# Patient Record
Sex: MALE | Race: WHITE | NOT HISPANIC OR LATINO | Employment: FULL TIME | URBAN - METROPOLITAN AREA
[De-identification: names, ages, dates, MRNs, and addresses within clinical notes are randomized per-mention and may not be internally consistent; named-entity substitution may affect disease eponyms.]

---

## 2017-01-12 ENCOUNTER — ANTICOAGULATION MONITORING (OUTPATIENT)
Dept: VASCULAR LAB | Facility: MEDICAL CENTER | Age: 42
End: 2017-01-12

## 2017-01-12 DIAGNOSIS — D68.2 FACTOR V DEFICIENCY (HCC): ICD-10-CM

## 2017-01-12 DIAGNOSIS — I73.9 PAD (PERIPHERAL ARTERY DISEASE) (HCC): ICD-10-CM

## 2017-01-12 NOTE — PROGRESS NOTES
OP Anticoagulation Telephone Note    Date: 1/12/2017       Plan:  Called to remind patient to get PT/INR lab done.  Left VM    Helen Remy, Pharm D

## 2017-01-12 NOTE — Clinical Note
Pt has been noncompliant with follow up, attempt to contact x 3 and sent letter with no response.  Can we discharge him from clinic?  Thank you,  Helen

## 2017-01-15 ENCOUNTER — TELEPHONE (OUTPATIENT)
Dept: VASCULAR LAB | Facility: MEDICAL CENTER | Age: 42
End: 2017-01-15

## 2017-01-15 NOTE — TELEPHONE ENCOUNTER
Patient has not responded to multiple phone calls and letters regarding anticoag f/u.    Pending any further patient contact we will d/c from anticoag clinic for non-compliance    Michael Bloch, MD  Anticoagulation Clinic    Cc:  BARBARA Ramey MD

## 2017-02-21 ENCOUNTER — ANTICOAGULATION MONITORING (OUTPATIENT)
Dept: VASCULAR LAB | Facility: MEDICAL CENTER | Age: 42
End: 2017-02-21

## 2017-02-21 DIAGNOSIS — D68.2 FACTOR V DEFICIENCY (HCC): ICD-10-CM

## 2017-02-21 DIAGNOSIS — I73.9 PAD (PERIPHERAL ARTERY DISEASE) (HCC): ICD-10-CM

## 2017-02-21 NOTE — PROGRESS NOTES
Discharged from [facility].  Secondary to non-adherence/non compliance  Serenity Wilcox, PHARMD

## 2017-03-17 RX ORDER — WARFARIN SODIUM 5 MG/1
2.5-5 TABLET ORAL
Qty: 30 TAB | Refills: 0 | Status: SHIPPED | OUTPATIENT
Start: 2017-03-17 | End: 2017-05-03 | Stop reason: SDUPTHER

## 2017-06-14 RX ORDER — WARFARIN SODIUM 5 MG/1
TABLET ORAL
Qty: 15 TAB | Refills: 0 | Status: SHIPPED | OUTPATIENT
Start: 2017-06-14 | End: 2017-07-19 | Stop reason: SDUPTHER

## 2017-06-14 NOTE — TELEPHONE ENCOUNTER
Patient has not done follow-up INR in a while. He is under the care of the Coumadin clinic. He should follow-up with them as soon as possible. I will only give 15 day supply. Patient also needs follow-up appointment with me.

## 2017-07-13 RX ORDER — WARFARIN SODIUM 5 MG/1
TABLET ORAL
Qty: 15 TAB | Refills: 0 | Status: CANCELLED | OUTPATIENT
Start: 2017-07-13

## 2017-07-13 NOTE — TELEPHONE ENCOUNTER
Per note from Dr. Ramey in June, patient goes to Coumadin Clinic. He needs to contact them for warfarin refills.  Aleksandra Trevino M.D.

## 2017-07-14 ENCOUNTER — TELEPHONE (OUTPATIENT)
Dept: MEDICAL GROUP | Facility: PHYSICIAN GROUP | Age: 42
End: 2017-07-14

## 2017-07-14 NOTE — TELEPHONE ENCOUNTER
Pt called back regarding his warfarin being denied. I notified him  denied medication due to Dr. Rodriguez note stating pt was going to coumadin clinic. Pt notified me he doesn't have insurance he lost it and doesn't want a blood clot and that's why he was asking Dr. Ramey. I notified pt that she is out till next week. I let him know I would notify Dr. Trevino.

## 2017-07-14 NOTE — TELEPHONE ENCOUNTER
How much warfarin was he taking? I'll send in a prescription for two weeks. Dr. Ramey had done this last month and said he needed to be seen. We cannot continue to prescribe this for him unless he comes in for a visit.   Aleksandra Trevino M.D.

## 2017-07-19 ENCOUNTER — TELEPHONE (OUTPATIENT)
Dept: MEDICAL GROUP | Facility: PHYSICIAN GROUP | Age: 42
End: 2017-07-19

## 2017-07-19 RX ORDER — WARFARIN SODIUM 5 MG/1
TABLET ORAL
Qty: 15 TAB | Refills: 0 | Status: SHIPPED | OUTPATIENT
Start: 2017-07-19 | End: 2017-11-01 | Stop reason: SDUPTHER

## 2017-07-19 NOTE — TELEPHONE ENCOUNTER
Patient called stating he is now completely out of coumadin and is requesting that  fill this Rx.  I spoke with the coumadin clinic as it appears patient was to be following up with them.  Brad at the clinic informed me that patient was discharged due to non-compliance.  Patient needs to contact them at [phone] opt [phone] and schedule and appt for refill.     Phone Number Called: [phone] (home)     Message: LVM to call back.    Left Message for patient to call back: yes

## 2017-07-20 ENCOUNTER — HOSPITAL ENCOUNTER (EMERGENCY)
Facility: MEDICAL CENTER | Age: 42
End: 2017-07-20
Attending: EMERGENCY MEDICINE

## 2017-07-20 VITALS
OXYGEN SATURATION: 95 % | SYSTOLIC BLOOD PRESSURE: 142 MMHG | HEART RATE: 88 BPM | DIASTOLIC BLOOD PRESSURE: 86 MMHG | BODY MASS INDEX: 23.35 KG/M2 | WEIGHT: 172.4 LBS | TEMPERATURE: 96.8 F | HEIGHT: 72 IN | RESPIRATION RATE: 16 BRPM

## 2017-07-20 DIAGNOSIS — F41.8 SITUATIONAL ANXIETY: ICD-10-CM

## 2017-07-20 DIAGNOSIS — R20.2 PARESTHESIAS IN RIGHT HAND: ICD-10-CM

## 2017-07-20 LAB
INR PPP: 2.25 (ref 0.87–1.13)
PROTHROMBIN TIME: 25.6 SEC (ref 12–14.6)

## 2017-07-20 PROCEDURE — 93971 EXTREMITY STUDY: CPT | Mod: 26 | Performed by: SURGERY

## 2017-07-20 PROCEDURE — 85610 PROTHROMBIN TIME: CPT

## 2017-07-20 PROCEDURE — 36415 COLL VENOUS BLD VENIPUNCTURE: CPT

## 2017-07-20 PROCEDURE — 93971 EXTREMITY STUDY: CPT

## 2017-07-20 PROCEDURE — 99284 EMERGENCY DEPT VISIT MOD MDM: CPT

## 2017-07-20 PROCEDURE — 93931 UPPER EXTREMITY STUDY: CPT | Mod: 26 | Performed by: SURGERY

## 2017-07-20 PROCEDURE — 93931 UPPER EXTREMITY STUDY: CPT

## 2017-07-20 RX ORDER — WARFARIN SODIUM 5 MG/1
5 TABLET ORAL DAILY
Qty: 30 TAB | Refills: 3 | Status: SHIPPED | OUTPATIENT
Start: 2017-07-20 | End: 2017-09-07 | Stop reason: SDUPTHER

## 2017-07-20 ASSESSMENT — LIFESTYLE VARIABLES
EVER HAD A DRINK FIRST THING IN THE MORNING TO STEADY YOUR NERVES TO GET RID OF A HANGOVER: NO
TOTAL SCORE: 0
CONSUMPTION TOTAL: INCOMPLETE
TOTAL SCORE: 0
EVER FELT BAD OR GUILTY ABOUT YOUR DRINKING: NO
DO YOU DRINK ALCOHOL: YES
HAVE PEOPLE ANNOYED YOU BY CRITICIZING YOUR DRINKING: NO
HAVE YOU EVER FELT YOU SHOULD CUT DOWN ON YOUR DRINKING: NO
TOTAL SCORE: 0

## 2017-07-20 ASSESSMENT — PAIN SCALES - GENERAL: PAINLEVEL_OUTOF10: 4

## 2017-07-20 NOTE — ED AVS SNAPSHOT
Home Care Instructions                                                                                                                Devon Martinez   MRN: [id number]    Department:  [facility], Emergency Dept   Date of Visit:  7/20/2017            [facility], Emergency Dept    [address]    [place] [address]    Phone:  [phone]      You were seen by     Aditya Aranda D.O.      Your Diagnosis Was     Paresthesias in right hand     R20.2       Follow-up Information     1. Follow up with Niles Coulter M.D..    Specialties:  Surgery, Radiology    Contact information    [id number]  [facility] [place] [address] [phone]        Medication Information     Review all of your home medications and newly ordered medications with your primary doctor and/or pharmacist as soon as possible. Follow medication instructions as directed by your doctor and/or pharmacist.     Please keep your complete medication list with you and share with your physician. Update the information when medications are discontinued, doses are changed, or new medications (including over-the-counter products) are added; and carry medication information at all times in the event of emergency situations.               Medication List      CHANGE how you take these medications        Instructions    Morning Afternoon Evening Bedtime    * warfarin 5 MG Tabs   What changed:  Another medication with the same name was added. Make sure you understand how and when to take each.   Commonly known as:  COUMADIN        Doctor's comments:  Patient needs an appointment for additional refills.   TAKE 1/2 TABLET BY MOUTH ON MONDAY, TUESDAY, WEDNESDAY, FRIDAY, SATURDAY. TAKE 1 TABLET ON THURSDAY                        * warfarin 5 MG Tabs   What changed:  You were already taking a medication with the same name, and this prescription was added. Make sure you understand how and when to take each.   Commonly known as:   COUMADIN        Take 1 Tab by mouth every day.   Dose:  5 mg                        * Notice:  This list has 2 medication(s) that are the same as other medications prescribed for you. Read the directions carefully, and ask your doctor or other care provider to review them with you.      ASK your doctor about these medications        Instructions    Morning Afternoon Evening Bedtime    citalopram 20 MG Tabs   Commonly known as:  CELEXA        Take 1 Tab by mouth every day.   Dose:  20 mg                        clonazepam 1 MG Tabs   Commonly known as:  KLONOPIN        Doctor's comments:  Not to exceed 5 additional fills before 09/13/2016. Patient needs appointment for more refills.   TAKE 1 TO 2 TABLETS BY MOUTH 3 TIMES A DAY AS NEEDED                        hydrocodone-acetaminophen 5-325 MG Tabs per tablet   Commonly known as:  NORCO        Take 2 Tabs by mouth every 12 hours.   Dose:  2 Tab                        lisinopril 10 MG Tabs   Commonly known as:  PRINIVIL        Take 1 Tab by mouth every day.   Dose:  10 mg                        omeprazole 20 MG delayed-release capsule   Commonly known as:  PRILOSEC        Take 1 Cap by mouth every day.   Dose:  20 mg                        simvastatin 40 MG Tabs   Commonly known as:  ZOCOR        Take 2 Tabs by mouth every evening.   Dose:  80 mg                        zolpidem 5 MG Tabs   Commonly known as:  AMBIEN        TAKE 1 TABLET BY MOUTH AT BEDTIME AS NEEDED FOR SLEEP                             Where to Get Your Medications      These medications were sent to [id number] - [place] - [address]  [address] [place] [address]     Phone:  [phone]    - warfarin 5 MG Tabs            Procedures and tests performed during your visit     PT/INR    UE ART DUPLX/IMAG (Specify in Comments Left, Right Or Bilateral)    UE VENOUS DUPLEX (Specify in Comments Left, Right Or Bilateral)        Discharge Instructions       Paresthesia  Paresthesia is  an abnormal burning or prickling sensation. This sensation is generally felt in the hands, arms, legs, or feet. However, it may occur in any part of the body. Usually, it is not painful. The feeling may be described as:  · Tingling or numbness.  · Pins and needles.  · Skin crawling.  · Buzzing.  · Limbs falling asleep.  · Itching.  Most people experience temporary (transient) paresthesia at some time in their lives. Paresthesia may occur when you breathe too quickly (hyperventilation). It can also occur without any apparent cause. Commonly, paresthesia occurs when pressure is placed on a nerve. The sensation quickly goes away after the pressure is removed. For some people, however, paresthesia is a long-lasting (chronic) condition that is caused by an underlying disorder. If you continue to have paresthesia, you may need further medical evaluation.  HOME CARE INSTRUCTIONS  Watch your condition for any changes. Taking the following actions may help to lessen any discomfort that you are feeling:  · Avoid drinking alcohol.  · Try acupuncture or massage to help relieve your symptoms.  · Keep all follow-up visits as directed by your health care provider. This is important.  SEEK MEDICAL CARE IF:  · You continue to have episodes of paresthesia.  · Your burning or prickling feeling gets worse when you walk.  · You have pain, cramps, or dizziness.  · You develop a rash.  SEEK IMMEDIATE MEDICAL CARE IF:  · You feel weak.  · You have trouble walking or moving.  · You have problems with speech, understanding, or vision.  · You feel confused.  · You cannot control your bladder or bowel movements.  · You have numbness after an injury.  · You faint.     This information is not intended to replace advice given to you by your health care provider. Make sure you discuss any questions you have with your health care provider.     Document Released: 12/08/2003 Document Revised: 05/03/2016 Document Reviewed: 12/14/2015  [employer]  Interactive Patient Education ©2016 Elsevier Inc.            Patient Information     Patient Information    Following emergency treatment: all patient requiring follow-up care must return either to a private physician or a clinic if your condition worsens before you are able to obtain further medical attention, please return to the emergency room.     Billing Information    At [facility], we work to make the billing process streamlined for our patients.  Our Representatives are here to answer any questions you may have regarding your hospital bill.  If you have insurance coverage and have supplied your insurance information to us, we will submit a claim to your insurer on your behalf.  Should you have any questions regarding your bill, we can be reached online or by phone as follows:  Online: You are able pay your bills online or live chat with our representatives about any billing questions you may have. We are here to help Monday - Friday from 8:00am to 7:30pm and 9:00am - 12:00pm on Saturdays.  Please visit [email or web address]  for more information.   Phone:  [phone] or [phone]    Please note that your emergency physician, surgeon, pathologist, radiologist, anesthesiologist, and other specialists are not employed by [facility] and will therefore bill separately for their services.  Please contact them directly for any questions concerning their bills at the numbers below:     Emergency Physician Services:  [phone]  [facility]:  [phone]  [facility]:  [phone]  [facility]:  [phone]    1. Your final bill may vary from the amount quoted upon discharge if all procedures are not complete at that time, or if your doctor has additional procedures of which we are not aware. You will receive an additional bill if you return to the Emergency Department at [facility] the [facility] for suture removal regardless of the  facility of which the sutures were placed.     2. Please arrange for settlement of this account at the emergency registration.    3. All self-pay accounts are due in full at the time of treatment.  If you are unable to meet this obligation then payment is expected within 4-5 days.     4. If you have had radiology studies (CT, X-ray, Ultrasound, MRI), you have received a preliminary result during your emergency department visit. Please contact the radiology department [phone] to receive a copy of your final result. Please discuss the Final result with your primary physician or with the follow up physician provided.     Crisis Hotline:  [place] Crisis Hotline:  5-866-LFKKNGY or 1-313.789.9553  [place] Crisis Hotline:    1-276.159.8285 or 835-538-0352         ED Discharge Follow Up Questions    1. In order to provide you with very good care, we would like to follow up with a phone call in the next few days.  May we have your permission to contact you?     YES /  NO    2. What is the best phone number to call you? (       )_____-__________    3. What is the best time to call you?      Morning  /  Afternoon  /  Evening                   Patient Signature:  ____________________________________________________________    Date:  ____________________________________________________________

## 2017-07-20 NOTE — ED PROVIDER NOTES
"ED Provider Note    CHIEF COMPLAINT  Chief Complaint   Patient presents with   • Hand Pain     rt, hx of factor V       HPI  Devon Martinez is a 41 y.o. male here for evaluation of tingling in the right hand. The patient states that he had some tingling in the fingers of his right hand, and felt them 'cooler' with some throbbing yesterday. He states this resolved, and today came in for evaluation. He states he had similar symptoms to the left lower leg when he \"lost the leg\" after waiting too long to come in. He does take Coumadin, and did take his last dose last evening. He has no vomiting, no fever, no chills. He has no chest pain or shortness of breath. He has no discoloration to his hands.  The pt admits to a lot of working with his hands recently, and may have gripped something in a strange way, causing the numbness.     PAST MEDICAL HISTORY   has a past medical history of Factor V deficiency (CMS-HCC); HTN (hypertension); Dyslipidemia; FREDRICK (generalized anxiety disorder); and Panic attacks.    SOCIAL HISTORY  Social History     Social History Main Topics   • Smoking status: Former Smoker -- 1.00 packs/day for 20 years     Types: Cigarettes   • Smokeless tobacco: Never Used      Comment: quit in 2011   • Alcohol Use: 0.0 oz/week     0 Standard drinks or equivalent per week      Comment: 4-5 beers/day   • Drug Use: No   • Sexual Activity:     Partners: Female       SURGICAL HISTORY   has past surgical history that includes femoral embolectomy (11/30/2009); artery exploration or repair (11/30/2009); thrombectomy (11/30/2009); angiogram (12/2/2009); femoral tibial bypass (12/2/2009); 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    CURRENT MEDICATIONS  Home Medications     Reviewed by Brad England, STUDENT (Nurse Apprentice) on 07/20/17 at 0846  Med List Status: Partial    Medication Last Dose Status    citalopram (CELEXA) 20 MG Tab  Active    clonazepam (KLONOPIN) 1 MG Tab  Active    " hydrocodone-acetaminophen (NORCO) 5-325 MG Tab per tablet  Active    lisinopril (PRINIVIL) 10 MG Tab  Active    omeprazole (PRILOSEC) 20 MG delayed-release capsule  Active    simvastatin (ZOCOR) 40 MG Tab  Active    warfarin (COUMADIN) 5 MG Tab 7/19/2017 Active    zolpidem (AMBIEN) 5 MG Tab  Active                ALLERGIES  Allergies   Allergen Reactions   • Pcn [Penicillins] Hives     RXN=>15 years ago       REVIEW OF SYSTEMS  See HPI for further details. Review of systems as above, otherwise all other systems are negative.     PHYSICAL EXAM  VITAL SIGNS: /86 mmHg  Pulse 97  Temp(Src) 36 °C (96.8 °F)  Resp 17  Ht 1.829 m (6')  Wt 78.2 kg (172 lb 6.4 oz)  BMI 23.38 kg/m2  SpO2 96%    Constitutional: No distress. Well nourished.  HENT: Head is atraumatic. Oropharynx is moist.   Eyes: Conjunctivae are normal. EOMI.   Respiratory: No respiratory distress. Equal chest expansion.   Musculoskeletal: Normal range of motion. No edema.  Right upper ext:  Normal temperature, n/v intact distally.  Cap refill less than 2 seconds, normal sensation.  Normal strength.  No lesions.   Neurological: Alert. No focal deficits noted.    Skin: No rash. No Pallor.   Psych: Appropriate for clinical situation. Normal affect.    PROCEDURES     MEDICAL RECORD  I have reviewed patient's medical record and pertinent results are listed above.    COURSE & MEDICAL DECISION MAKING  I have reviewed any medical record information, laboratory studies and radiographic results as noted above.    UE ART DUPLX/IMAG (Specify in Comments Left, Right Or Bilateral)   Preliminary Result      UE VENOUS DUPLEX (Specify in Comments Left, Right Or Bilateral)   Preliminary Result        Results for orders placed or performed during the hospital encounter of 07/20/17   PT/INR   Result Value Ref Range    PT 25.6 (H) 12.0 - 14.6 sec    INR 2.25 (H) 0.87 - 1.13     11:00 AM  Pt remains asymptomatic at this time.     11:17 AM  At this time, the pt had some  intermittent paresthesias to this right hand, that resolved quickly.  He has a normal exam, normal cap refill, normal sensation, and normal pulses.  He has had an u/s of the arterial and venous system.  They are both normal.  He has equal .  His hand is of normal temperature.  His INR is therapeutic at this time, and he is compliant with his Coumadin. This time I'll have him follow-up with Dr. Coulter as necessary, and return here for any further issues.    11:27 AM  I spoke to Dr. Coulter.  He states ok for discharge.  Nothing to do at this time.       Differential diagnoses include but not limited to:  Venous / arterial occlusion.  Anxiety,    This patient presents with hand paresthesia and coolness, all now resolved. .  At this time, I have counseled the patient/family regarding their medications, pain control, and follow up.  They will continue their medications, if any, as prescribed.  They will return immediately for any worsening symptoms and/or any other medical concerns.  They will see their doctor, or contact the doctor provided, in 1-2 days for follow up.         FINAL IMPRESSION  1. Paresthesias in right hand    2. Situational anxiety                      Electronically signed by: Aditya Aranda, 7/20/2017 9:01 AM

## 2017-07-20 NOTE — ED NOTES
Assumed care of patient, discussed plan of care with patient, MD at bedside, all questions answered, VSS, NAD

## 2017-07-20 NOTE — TELEPHONE ENCOUNTER
I called the patient left message on his voicemail that he needs an appointment because I have not seen him since January 2016. He also needs blood work including INR because the last one was in March 2016. Blood work orders are already in the computer. I'm only giving 15 tablets and told he is able to do the lab and follow-up.

## 2017-07-20 NOTE — DISCHARGE INSTRUCTIONS
Paresthesia  Paresthesia is an abnormal burning or prickling sensation. This sensation is generally felt in the hands, arms, legs, or feet. However, it may occur in any part of the body. Usually, it is not painful. The feeling may be described as:  · Tingling or numbness.  · Pins and needles.  · Skin crawling.  · Buzzing.  · Limbs falling asleep.  · Itching.  Most people experience temporary (transient) paresthesia at some time in their lives. Paresthesia may occur when you breathe too quickly (hyperventilation). It can also occur without any apparent cause. Commonly, paresthesia occurs when pressure is placed on a nerve. The sensation quickly goes away after the pressure is removed. For some people, however, paresthesia is a long-lasting (chronic) condition that is caused by an underlying disorder. If you continue to have paresthesia, you may need further medical evaluation.  HOME CARE INSTRUCTIONS  Watch your condition for any changes. Taking the following actions may help to lessen any discomfort that you are feeling:  · Avoid drinking alcohol.  · Try acupuncture or massage to help relieve your symptoms.  · Keep all follow-up visits as directed by your health care provider. This is important.  SEEK MEDICAL CARE IF:  · You continue to have episodes of paresthesia.  · Your burning or prickling feeling gets worse when you walk.  · You have pain, cramps, or dizziness.  · You develop a rash.  SEEK IMMEDIATE MEDICAL CARE IF:  · You feel weak.  · You have trouble walking or moving.  · You have problems with speech, understanding, or vision.  · You feel confused.  · You cannot control your bladder or bowel movements.  · You have numbness after an injury.  · You faint.     This information is not intended to replace advice given to you by your health care provider. Make sure you discuss any questions you have with your health care provider.     Document Released: 12/08/2003 Document Revised: 05/03/2016 Document Reviewed:  12/14/2015  Elsevier Interactive Patient Education ©2016 Elsevier Inc.

## 2017-07-20 NOTE — ED NOTES
Patient was discharged to the [facility], ambulates with a steady gait, verbalizes understanding of discharge instructions, alert and oriented during discharge, all belongings accounted for

## 2017-07-20 NOTE — ED AVS SNAPSHOT
7/20/2017    Devon Martinez  [address]  [place] [address]    Dear Devon:    [facility] wants to ensure your discharge home is safe and you or your loved ones have had all of your questions answered regarding your care after you leave the hospital.    Below is a list of resources and contact information should you have any questions regarding your hospital stay, follow-up instructions, or active medical symptoms.    Questions or Concerns Regarding… Contact   Medical Questions Related to Your Discharge  (7 days a week, 8am-5pm) Contact a Nurse Care Coordinator   [phone]   Medical Questions Not Related to Your Discharge  (24 hours a day / 7 days a week)  Contact the Nurse Health Line   [phone]    Medications or Discharge Instructions Refer to your discharge packet   or contact your [facility] Primary Care Provider   [phone]   Follow-up Appointment(s) Schedule your appointment via [employer]   or contact Scheduling [phone]   Billing Review your statement via [employer]  or contact Billing [phone]   Medical Records Review your records via [employer]   or contact Medical Records [phone]     You may receive a telephone call within two days of discharge. This call is to make certain you understand your discharge instructions and have the opportunity to have any questions answered. You can also easily access your medical information, test results and upcoming appointments via the [employer] free online health management tool. You can learn more and sign up at [employer]. For assistance setting up your [employer] account, please call [phone].    Once again, we want to ensure your discharge home is safe and that you have a clear understanding of any next steps in your care. If you have any questions or concerns, please do not hesitate to contact us, we are here for you. Thank you for choosing [facility] for your healthcare needs.    Sincerely,    Your [facility] Healthcare Team

## 2017-07-20 NOTE — ED AVS SNAPSHOT
Niblitz Access Code: Activation code not generated  Current Niblitz Status: Patient Declined    [employer]  A secure, online tool to manage your health information     [employer]’s Niblitz® is a secure, online tool that connects you to your personalized health information from the privacy of your home -- day or night - making it very easy for you to manage your healthcare. Once the activation process is completed, you can even access your medical information using the Niblitz destiny, which is available for free in the Apple Destiny store or Google Play store.     Niblitz provides the following levels of access (as shown below):   My Chart Features   [facility] Primary Care Doctor [facility]  Specialists [facility]  Urgent  Care Non-[facility]  Primary Care  Doctor   Email your healthcare team securely and privately 24/7 X X X X   Manage appointments: schedule your next appointment; view details of past/upcoming appointments X      Request prescription refills. X      View recent personal medical records, including lab and immunizations X X X X   View health record, including health history, allergies, medications X X X X   Read reports about your outpatient visits, procedures, consult and ER notes X X X X   See your discharge summary, which is a recap of your hospital and/or ER visit that includes your diagnosis, lab results, and care plan. X X       How to register for Niblitz:  1. Go to  [email or web address].  2. Click on the Sign Up Now box, which takes you to the New Member Sign Up page. You will need to provide the following information:  a. Enter your Niblitz Access Code exactly as it appears at the top of this page. (You will not need to use this code after you’ve completed the sign-up process. If you do not sign up before the expiration date, you must request a new code.)   b. Enter your date of birth.   c. Enter your home email address.   d. Click Submit, and follow the next screen’s instructions.  3. Create a Niblitz ID.  This will be your Scribd login ID and cannot be changed, so think of one that is secure and easy to remember.  4. Create a Scribd password. You can change your password at any time.  5. Enter your Password Reset Question and Answer. This can be used at a later time if you forget your password.   6. Enter your e-mail address. This allows you to receive e-mail notifications when new information is available in Scribd.  7. Click Sign Up. You can now view your health information.    For assistance activating your Scribd account, call [phone]

## 2017-07-21 NOTE — TELEPHONE ENCOUNTER
Appears patient called back and  gave him a short supply of coumadin and patient was informed he needs an appt and INR.  Refer to 7/19/17 telephone encounter.

## 2017-09-07 RX ORDER — WARFARIN SODIUM 5 MG/1
5 TABLET ORAL DAILY
Qty: 30 TAB | Refills: 0 | Status: SHIPPED | OUTPATIENT
Start: 2017-09-07 | End: 2017-10-12 | Stop reason: SDUPTHER

## 2017-09-08 NOTE — TELEPHONE ENCOUNTER
Pt needs appt. Only 30 day supply given. If he has no insurance pls give phone number of [facility] and the [facility] clinic. They take pts w/o insurance.

## 2017-09-08 NOTE — TELEPHONE ENCOUNTER
VOICEMAIL  1. Caller Name: Devon Martinez                        Call Back Number: [phone] (home)       2. Message: Called and left patient a message to call back to inform him of  response. LM     3. Patient approves office to leave a detailed voicemail/MyChart message: N\A

## 2017-09-08 NOTE — TELEPHONE ENCOUNTER
Was the patient seen in the last year in this department? Yes     Does patient have an active prescription for medications requested? No     Received Request Via: Pharmacy   Patient tried to get a refill threw coumadin clinic and they told him his primary had to do it. Patient has no seen  since 01/13/2016

## 2017-09-11 ENCOUNTER — TELEPHONE (OUTPATIENT)
Dept: MEDICAL GROUP | Facility: PHYSICIAN GROUP | Age: 42
End: 2017-09-11

## 2017-09-11 NOTE — TELEPHONE ENCOUNTER
Patient called and left . Patient wants to know the update on his medication. I tried to call patient back again today. And no answer. Patient was given a 30 day supply. And he needs an appointment. CALOS

## 2017-10-12 DIAGNOSIS — D68.2 FACTOR V DEFICIENCY (HCC): ICD-10-CM

## 2017-11-01 RX ORDER — WARFARIN SODIUM 5 MG/1
TABLET ORAL
Qty: 15 TAB | Refills: 0 | Status: SHIPPED | OUTPATIENT
Start: 2017-11-01 | End: 2017-11-30 | Stop reason: SDUPTHER

## 2017-11-02 NOTE — TELEPHONE ENCOUNTER
Has not had a PT/INR since July 2017. There is standing order for PT/INR. He needs to get this done. He also needs follow-up with me.

## 2017-12-29 ENCOUNTER — TELEPHONE (OUTPATIENT)
Dept: MEDICAL GROUP | Facility: PHYSICIAN GROUP | Age: 42
End: 2017-12-29

## 2017-12-29 NOTE — TELEPHONE ENCOUNTER
1. Caller Name: Devon Martinez                                           Call Back Number: [phone] (home)         Patient approves a detailed voicemail message: yes  Patient called and left  VM. Patient is requesting a refill for medication. Patient does not state which medication. Called and left patient a VM. Let patient know Coumadin was refilled yesterday 12/28/2017 and it was sent to [facility] in [place]. Back in 10/12/2017 medication was filled to [facility] in [place]. Let patient know we need to know where he wants medication to go. LM

## 2018-01-08 ENCOUNTER — ANTICOAGULATION MONITORING (OUTPATIENT)
Dept: VASCULAR LAB | Facility: MEDICAL CENTER | Age: 43
End: 2018-01-08

## 2018-01-08 RX ORDER — WARFARIN SODIUM 5 MG/1
5 TABLET ORAL DAILY
Qty: 15 TAB | Refills: 0 | Status: SHIPPED | OUTPATIENT
Start: 2018-01-08 | End: 2018-01-08 | Stop reason: SDUPTHER

## 2018-01-08 RX ORDER — WARFARIN SODIUM 5 MG/1
5 TABLET ORAL DAILY
Qty: 30 TAB | Refills: 0 | Status: SHIPPED | OUTPATIENT
Start: 2018-01-08 | End: 2020-01-24

## 2018-01-08 NOTE — PROGRESS NOTES
Pt called, he has moved and is trying to get a new PCP to take over warfarin management. Sent in a 15 days Rx, he needs to get a INR. We can help via the phone until he gets a new PCP.     Aurelio Vanessa, DarrenD

## 2018-03-09 RX ORDER — WARFARIN SODIUM 5 MG/1
TABLET ORAL
Refills: 0 | OUTPATIENT
Start: 2018-03-09

## 2018-03-09 NOTE — TELEPHONE ENCOUNTER
3/9/2018    Received refill request from [facility] in [facility]. We are no longer managing this patient. Refused refill.    Brian Flanagan, PharmD

## 2018-03-22 ENCOUNTER — TELEPHONE (OUTPATIENT)
Dept: MEDICAL GROUP | Facility: PHYSICIAN GROUP | Age: 43
End: 2018-03-22

## 2018-03-22 NOTE — TELEPHONE ENCOUNTER
Patient Called and needed a refill on his medication.  He Lives in [place] and has not seen Dr. Ramey I over a year,  He wanted to come in and see her 3/22/2018 but Dr. Ramey was not available  And no other provider was available or if they were they was not comfortable dealing with a noncompliant patient and coumadin.  I offered to schedule him the Pharmacist that comes to clinic on Mondays, but he was not available them So I suggested that he call the coumadin clinic and talk to them and gave him the telephone number.

## 2020-01-22 ENCOUNTER — OFFICE VISIT (OUTPATIENT)
Dept: MEDICAL GROUP | Facility: PHYSICIAN GROUP | Age: 45
End: 2020-01-22
Payer: COMMERCIAL

## 2020-01-22 VITALS
HEIGHT: 71 IN | HEART RATE: 71 BPM | TEMPERATURE: 99.1 F | WEIGHT: 162.92 LBS | SYSTOLIC BLOOD PRESSURE: 120 MMHG | DIASTOLIC BLOOD PRESSURE: 70 MMHG | OXYGEN SATURATION: 94 % | BODY MASS INDEX: 22.81 KG/M2

## 2020-01-22 DIAGNOSIS — Z89.512 S/P BKA (BELOW KNEE AMPUTATION), LEFT (HCC): ICD-10-CM

## 2020-01-22 DIAGNOSIS — F41.1 GAD (GENERALIZED ANXIETY DISORDER): ICD-10-CM

## 2020-01-22 DIAGNOSIS — Z23 NEED FOR TDAP VACCINATION: ICD-10-CM

## 2020-01-22 DIAGNOSIS — D68.2 FACTOR V DEFICIENCY (HCC): ICD-10-CM

## 2020-01-22 DIAGNOSIS — E78.5 DYSLIPIDEMIA: ICD-10-CM

## 2020-01-22 DIAGNOSIS — I10 ESSENTIAL HYPERTENSION: ICD-10-CM

## 2020-01-22 PROCEDURE — 99204 OFFICE O/P NEW MOD 45 MIN: CPT | Mod: 25 | Performed by: FAMILY MEDICINE

## 2020-01-22 PROCEDURE — 90715 TDAP VACCINE 7 YRS/> IM: CPT | Performed by: FAMILY MEDICINE

## 2020-01-22 PROCEDURE — 90471 IMMUNIZATION ADMIN: CPT | Performed by: FAMILY MEDICINE

## 2020-01-22 RX ORDER — WARFARIN SODIUM 5 MG/1
5 TABLET ORAL DAILY
Qty: 90 TAB | Refills: 1 | Status: SHIPPED | OUTPATIENT
Start: 2020-01-22 | End: 2020-10-20

## 2020-01-22 RX ORDER — LISINOPRIL 30 MG/1
30 TABLET ORAL DAILY
Qty: 90 TAB | Refills: 1 | Status: SHIPPED | OUTPATIENT
Start: 2020-01-22 | End: 2020-11-24 | Stop reason: SDUPTHER

## 2020-01-22 RX ORDER — ATORVASTATIN CALCIUM 10 MG/1
10 TABLET, FILM COATED ORAL NIGHTLY
COMMUNITY
End: 2020-01-22 | Stop reason: SDUPTHER

## 2020-01-22 RX ORDER — ATORVASTATIN CALCIUM 10 MG/1
10 TABLET, FILM COATED ORAL
Qty: 90 TAB | Refills: 1 | Status: SHIPPED | OUTPATIENT
Start: 2020-01-22 | End: 2020-11-24 | Stop reason: SDUPTHER

## 2020-01-22 ASSESSMENT — PATIENT HEALTH QUESTIONNAIRE - PHQ9: CLINICAL INTERPRETATION OF PHQ2 SCORE: 0

## 2020-01-22 NOTE — LETTER
[facility]  Nicolasa Ramey M.D.  [address]  [place] [address]  Fax: [phone]   Authorization for Release/Disclosure of   Protected Health Information   Name: BANDAR MARTINEZ : 1975 SSN: [id number]   Address: [address] [address] Phone:    [phone] (home)    I authorize the entity listed below to release/disclose the PHI below to:   [facility]/Nicolasa Ramey M.D. and Nicolasa Ramey M.D.   Provider or Entity Name:  FABRIZIO Phillips MD,   [facility],    Address   City, [facility], [place], [place] Phone:      Fax:     Reason for request: continuity of care   Information to be released:    [  ] LAST COLONOSCOPY,  including any PATH REPORT and follow-up  [  ] LAST FIT/COLOGUARD RESULT [  ] LAST DEXA  [  ] LAST MAMMOGRAM  [  ] LAST PAP  [  ] LAST LABS [  ] RETINA EXAM REPORT  [  ] IMMUNIZATION RECORDS  [  ] Release all info      [  ] Check here and initial the line next to each item to release ALL health information INCLUDING  _____ Care and treatment for drug and / or alcohol abuse  _____ HIV testing, infection status, or AIDS  _____ Genetic Testing    DATES OF SERVICE OR TIME PERIOD TO BE DISCLOSED: _____________  I understand and acknowledge that:  * This Authorization may be revoked at any time by you in writing, except if your health information has already been used or disclosed.  * Your health information that will be used or disclosed as a result of you signing this authorization could be re-disclosed by the recipient. If this occurs, your re-disclosed health information may no longer be protected by State or Federal laws.  * You may refuse to sign this Authorization. Your refusal will not affect your ability to obtain treatment.  * This Authorization becomes effective upon signing and will  on (date) __________.      If no date is indicated, this Authorization will  one (1) year from the signature date.    Name: Bandar Martinez    Signature:   Date:     2020            PLEASE FAX REQUESTED RECORDS BACK TO: [phone]

## 2020-01-23 NOTE — PROGRESS NOTES
Subjective:      Devon Martinez is a 44 y.o. male who presents with Establish Care (reestablish) and Other (Prosthtic supplies)      HPI:    The patient is here today to re-establish care. He has moved back to [place] from [place] about 6 months ago for his work. His prior PCP was through [facility] in [place].  He was last seen in our office in December 2016.  He said he moved to [place] because of his divorce.  His previous job here in the area rehired that is why he moved back to the area.  He said he is paying about $1000 per month of child support.    Essential Hypertension  He is currently taking Lisinopril 30 mg for his hypertension without any side effects. He was previously taking 10 mg but he said the dose was increased by his PCP in [place] to better control his blood pressure. Blood pressure in the office today is within goal.      Dyslipidemia  He was previously taking Simvastatin for his dyslipidemia, but he is now taking Atorvastatin 10 mg. He denies any myalgias or other side effects.  We need to get an updated lipid panel.    FREDRICK (generalized anxiety disorder)  He is currently taking Setraline 50 mg for his anxiety with good control of his symptoms. He was previously taking Celexa, but this was switched by his last PCP due to insurance preference.  He was previously on Klonopin as needed for anxiety but he has been able to get off the medication after his last visit with us.    Factor V deficiency (HCC)   S/P BKA (below knee amputation), left (HCC)  He continues to take Warfarin 5 mg MWF, and 2.5 mg Tues, Thursday, Saturday, Sunday. He has not been regularly following up for INR monitoring. He would like refills of his medication. He states that his last INR was over 6 months ago was within range. 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 He currently has a prosthesis in place. He states that he continues to have symptoms of phantom  pain for which he smokes marijuana.  He was previously on hydrocodone/APAP to manage the pain but he has been able to get off the medication and now using only marijuana to help with the pain.  He would like to get a marijuana card.  He is requesting a prescription for prosthetic supplies including pads and liners.    Health Maintenance   He declined to receive a flu vaccination today. He would like a TDAP today.        I reviewed the following    Past Medical History:   Diagnosis Date   • Dyslipidemia    • Factor V deficiency (CMS-HCC)     Dr. Bloch   • FREDRICK (generalized anxiety disorder)    • HTN (hypertension)    • Panic attacks         Past Surgical History:   Procedure Laterality Date   • DENTAL EXTRACTION(S)  11/22/2011    Performed by KARINA ROBERSON at SURGERY [facility]   • KNEE AMPUTATION BELOW  12/3/2009    Performed by JOSELUIS OLIVEIRA at SURGERY [facility]   • ANGIOGRAM  12/2/2009    Performed by JOSELUIS OLIVEIRA at [facility]   • FEMORAL TIBIAL BYPASS  12/2/2009    Performed by JOSELUIS OLIVEIRA at SURGERY [facility]   • FEMORAL EMBOLECTOMY  11/30/2009    Performed by JOSELUIS OLIVEIRA at [facility]   • ARTERY EXPLORATION OR REPAIR  11/30/2009    Performed by JOSELUIS OLIVEIRA at [facility]   • THROMBECTOMY  11/30/2009    Performed by JOSELUIS OLIVEIRA at [facility]       Allergies   Allergen Reactions   • Pcn [Penicillins] Hives     RXN=>15 years ago       Current Outpatient Medications   Medication Sig Dispense Refill   • atorvastatin (LIPITOR) 10 MG Tab Take 1 Tab by mouth every bedtime. 90 Tab 1   • sertraline (ZOLOFT) 50 MG Tab Take 1 Tab by mouth every day. 90 Tab 1   • lisinopril (PRINIVIL) 30 MG tablet Take 1 Tab by mouth every day. 90 Tab 1   • warfarin (COUMADIN) 5 MG Tab Take 1 Tab by mouth every day. 90 Tab 1   • warfarin (COUMADIN) 5 MG Tab Take 1 Tab by mouth every day. 30 Tab 0   • lisinopril (PRINIVIL) 10 MG Tab Take 1 Tab by  mouth every day. 90 Tab 0   • warfarin (COUMADIN) 5 MG Tab TAKE 1 TAB BY MOUTH EVERY DAY. (Patient not taking: Reported on 1/22/2020) 30 Tab 0   • omeprazole (PRILOSEC) 20 MG delayed-release capsule Take 1 Cap by mouth every day. (Patient not taking: Reported on 1/22/2020) 90 Cap 3   • citalopram (CELEXA) 20 MG Tab Take 1 Tab by mouth every day. (Patient not taking: Reported on 1/22/2020) 90 Tab 3   • zolpidem (AMBIEN) 5 MG Tab TAKE 1 TABLET BY MOUTH AT BEDTIME AS NEEDED FOR SLEEP (Patient not taking: Reported on 1/22/2020) 30 Tab 0   • hydrocodone-acetaminophen (NORCO) 5-325 MG Tab per tablet Take 2 Tabs by mouth every 12 hours. (Patient not taking: Reported on 1/22/2020) 60 Tab 0   • simvastatin (ZOCOR) 40 MG Tab Take 2 Tabs by mouth every evening. (Patient not taking: Reported on 1/22/2020) 180 Tab 3   • clonazepam (KLONOPIN) 1 MG Tab TAKE 1 TO 2 TABLETS BY MOUTH 3 TIMES A DAY AS NEEDED (Patient not taking: Reported on 12/29/2016) 60 Tab 0     No current facility-administered medications for this visit.         Family History   Problem Relation Age of Onset   • Clotting Disorder Mother         Factor V leiden def   • Psychiatric Illness Mother         Bipolar d/o   • Stroke Father    • Heart Disease Father         A.Fib   • Clotting Disorder Brother         factor v leiden def   • Clotting Disorder Brother         factor v leiden def       Social History     Socioeconomic History   • Marital status:    • Number of children: 4   Occupational History   • Occupation: dispatch     Employer: [employer]   Tobacco Use   • Smoking status: Former Smoker     Packs/day: 1.00     Years: 20.00     Pack years: 20.00     Types: Cigarettes   • Smokeless tobacco: Never Used   • Tobacco comment: quit in 2011   Substance and Sexual Activity   • Alcohol use: Yes     Alcohol/week: 0.0 oz     Comment: 4-5 beers/day   • Drug use: No   • Sexual activity: Yes     Partners: Female      ROS:  As per the HPI as shown above, the  "rest are negative.       Objective:     /70 (BP Location: Left arm, Patient Position: Sitting, BP Cuff Size: Adult)   Pulse 71   Temp 37.3 °C (99.1 °F) (Temporal)   Ht 1.803 m (5' 11\")   Wt 73.9 kg (162 lb 14.7 oz)   SpO2 94%   BMI 22.72 kg/m²     Physical Exam  Vitals signs and nursing note reviewed.   Constitutional:       General: He is not in acute distress.     Appearance: He is well-developed. He is not diaphoretic.   HENT:      Head: Normocephalic and atraumatic.      Right Ear: External ear normal.      Left Ear: External ear normal.      Nose: Nose normal.      Mouth/Throat:      Pharynx: No oropharyngeal exudate.   Eyes:      General: No scleral icterus.        Right eye: No discharge.         Left eye: No discharge.      Conjunctiva/sclera: Conjunctivae normal.      Pupils: Pupils are equal, round, and reactive to light.   Neck:      Musculoskeletal: Normal range of motion and neck supple.      Thyroid: No thyromegaly.      Vascular: No JVD.      Trachea: No tracheal deviation.   Cardiovascular:      Rate and Rhythm: Normal rate and regular rhythm.      Heart sounds: Normal heart sounds. No murmur. No friction rub. No gallop.    Pulmonary:      Effort: Pulmonary effort is normal. No respiratory distress.      Breath sounds: Normal breath sounds. No stridor. No wheezing or rales.   Chest:      Chest wall: No tenderness.   Abdominal:      General: Bowel sounds are normal. There is no distension.      Palpations: Abdomen is soft. There is no mass.      Tenderness: There is no tenderness. There is no guarding or rebound.      Hernia: No hernia is present.   Musculoskeletal: Normal range of motion.         General: No tenderness.      Comments: Left leg BKA   Lymphadenopathy:      Cervical: No cervical adenopathy.   Skin:     General: Skin is warm and dry.      Coloration: Skin is not pale.      Findings: No erythema or rash.   Neurological:      Mental Status: He is alert and oriented to person, " place, and time.      Cranial Nerves: No cranial nerve deficit.      Motor: No abnormal muscle tone.      Coordination: Coordination normal.      Deep Tendon Reflexes: Reflexes are normal and symmetric. Reflexes normal.   Psychiatric:         Behavior: Behavior normal.         Thought Content: Thought content normal.         Judgment: Judgment normal.       Assessment/Plan:     1. Essential hypertension  Blood pressure is stable and within goal on his current medication. We will continue his current dosage. I have advised him to avoid salty foods and exercise regularly.   - Lipid Profile; Future  - Comp Metabolic Panel; Future  - CBC WITH DIFFERENTIAL; Future  - Prothrombin Time; Future  - lisinopril (PRINIVIL) 30 MG tablet; Take 1 Tab by mouth every day.  Dispense: 90 Tab; Refill: 1    2. Dyslipidemia  Continue current Atorvastatin dosage. Labs have been ordered for him to complete, and we will adjust as needed.   - Lipid Profile; Future  - Comp Metabolic Panel; Future  - CBC WITH DIFFERENTIAL; Future  - Prothrombin Time; Future  - atorvastatin (LIPITOR) 10 MG Tab; Take 1 Tab by mouth every bedtime.  Dispense: 90 Tab; Refill: 1    3. FREDRICK (generalized anxiety disorder)  Stable and well-controlled on his current Zoloft dosage. We will continue the current plan of care and follow-up.   - Lipid Profile; Future  - Comp Metabolic Panel; Future  - CBC WITH DIFFERENTIAL; Future  - Prothrombin Time; Future  - sertraline (ZOLOFT) 50 MG Tab; Take 1 Tab by mouth every day.  Dispense: 90 Tab; Refill: 1    4. Factor V deficiency (HCC)  This is a chronic issue. Patient is currently anticoagulated on Warfarin 5 mg MWF, 2.5 mg Tuesday, Thursday, Saturday, and Sunday. He has not had his INR checked recently. I have referred the patient to the Coumadin Clinic so they can closely follow his INR levels and adjust therapy as needed. I have ordered labs to check his INR in the meantime and adjust the dose if needed.   .   - REFERRAL TO  VASCULAR MEDICINE Reason for Referral? Coagulation Disorders  - Lipid Profile; Future  - Comp Metabolic Panel; Future  - CBC WITH DIFFERENTIAL; Future  - Prothrombin Time; Future  - warfarin (COUMADIN) 5 MG Tab; Take 1 Tab by mouth every day.  Dispense: 90 Tab; Refill: 1    5. S/P BKA (below knee amputation), left (HCC)  Patient is s/p left BKA secondary to limb ischemia due to arterial occlusion from factor V deficiency. He is doing well with his prosthesis. I have provided a paper prescription for prosthesis supplies, such as pads and linings which he said he will get from [employer].  - Lipid Profile; Future  - Comp Metabolic Panel; Future  - CBC WITH DIFFERENTIAL; Future  - Prothrombin Time; Future    6. Need for Tdap vaccination  TDAP vaccination given in the office today without adverse effects.   - Tdap Vaccine =>8YO IM     Return in 3 months for follow-up.  We will get records from previous physician.    Fabricio ANDERSON (Elayne), am scribing for, and in the presence of, Nicolasa Ramey MD     Electronically signed by: Fabricio Joseph (Elayne), 1/22/2020    Nicolasa ANDERSON MD personally performed the services described in this documentation, as scribed by Fabricio Joseph in my presence, and it is both accurate and complete.

## 2020-01-24 ENCOUNTER — TELEPHONE (OUTPATIENT)
Dept: VASCULAR LAB | Facility: MEDICAL CENTER | Age: 45
End: 2020-01-24

## 2020-01-24 NOTE — TELEPHONE ENCOUNTER
[facility]    Received anticoagulation referral.    PCP: Dr Ramey  INS: [facility]  Preferred location: [place]    Pt has moved back to the area and needs to establish care with anticoag monitoring.  LM for pt to call clinic and establish care.     Bryan Soto, PharmD

## 2020-01-28 ENCOUNTER — TELEPHONE (OUTPATIENT)
Dept: VASCULAR LAB | Facility: MEDICAL CENTER | Age: 45
End: 2020-01-28

## 2020-01-29 ENCOUNTER — TELEPHONE (OUTPATIENT)
Dept: VASCULAR LAB | Facility: MEDICAL CENTER | Age: 45
End: 2020-01-29

## 2020-02-03 ENCOUNTER — TELEPHONE (OUTPATIENT)
Dept: VASCULAR LAB | Facility: MEDICAL CENTER | Age: 45
End: 2020-02-03

## 2020-02-03 NOTE — TELEPHONE ENCOUNTER
[facility]     Received anticoagulation referral.     PCP: Dr Ramey  INS: [facility]  Preferred location: [place]     Pt has moved back to the area and needs to establish care with anticoag monitoring.     Left VM for pt to call clinic and establish care.     Helen Remy, PharmD

## 2020-02-10 ENCOUNTER — TELEPHONE (OUTPATIENT)
Dept: VASCULAR LAB | Facility: MEDICAL CENTER | Age: 45
End: 2020-02-10

## 2020-02-14 ENCOUNTER — TELEPHONE (OUTPATIENT)
Dept: VASCULAR LAB | Facility: MEDICAL CENTER | Age: 45
End: 2020-02-14

## 2020-02-14 NOTE — TELEPHONE ENCOUNTER
[facility]    Attempted to reach patient again in regards to anticoagulation referral we received to monitor patient.     PCP: Dr Ramey   INS: [facility]  Preferred location: [place]?  (he lives in [place])    Patient has moved back to the area and needs to establish care for anticoag monitoring.   Patient also needs to schedule vasc appt with Dr. Boucher.     LM to please call clinic back to establish care.     Tushar Nunez  PharmD

## 2020-02-19 ENCOUNTER — TELEPHONE (OUTPATIENT)
Dept: VASCULAR LAB | Facility: MEDICAL CENTER | Age: 45
End: 2020-02-19

## 2020-02-19 NOTE — LETTER
February 19, 2020    Devon Martinez  [address]  [place] [address]      Dear Devon Martinez ,    We have been unsuccessful in our attempts to contact you regarding your Anticoagulation Service referral.  Anticoagulants are medications that can cause potential harmful side effects when not monitored properly. Without proper monitoring there is potential for serious, sometimes life-threatening bleeding problems or life-threatening blood clots or stroke could result.    Please contact our clinic so we may assist you.  We are open Monday-Friday 8 am until 5 pm.  You may reach our Service at [phone].    To monitor your anticoagulant effectively, we need to be able to communicate with you.  This is a requirement to be followed by our Service.  Please contact the clinic as soon as possible to establish care and provide your current contact information.  Thank you.        Sincerely,        Brian Flanagan, PharmD, [facility]  Pharmacy Clinical Supervisor  [facility]  Outpatient Anticoagulation Service

## 2020-02-19 NOTE — TELEPHONE ENCOUNTER
Have been unable to establish care for anticoagulation referral.   Sent letter and FYI to referring MD.     Laurie Ko, PharmD

## 2020-02-29 ENCOUNTER — APPOINTMENT (OUTPATIENT)
Dept: RADIOLOGY | Facility: MEDICAL CENTER | Age: 45
End: 2020-02-29
Attending: EMERGENCY MEDICINE
Payer: COMMERCIAL

## 2020-02-29 ENCOUNTER — HOSPITAL ENCOUNTER (EMERGENCY)
Facility: MEDICAL CENTER | Age: 45
End: 2020-02-29
Attending: EMERGENCY MEDICINE
Payer: COMMERCIAL

## 2020-02-29 VITALS
BODY MASS INDEX: 21.91 KG/M2 | RESPIRATION RATE: 18 BRPM | HEART RATE: 85 BPM | TEMPERATURE: 97.7 F | WEIGHT: 156.53 LBS | HEIGHT: 71 IN | DIASTOLIC BLOOD PRESSURE: 91 MMHG | OXYGEN SATURATION: 92 % | SYSTOLIC BLOOD PRESSURE: 132 MMHG

## 2020-02-29 DIAGNOSIS — J22 LOWER RESPIRATORY INFECTION: ICD-10-CM

## 2020-02-29 LAB
ALBUMIN SERPL BCP-MCNC: 4 G/DL (ref 3.2–4.9)
ALBUMIN/GLOB SERPL: 1.2 G/DL
ALP SERPL-CCNC: 69 U/L (ref 30–99)
ALT SERPL-CCNC: 13 U/L (ref 2–50)
ANION GAP SERPL CALC-SCNC: 13 MMOL/L (ref 0–11.9)
AST SERPL-CCNC: 24 U/L (ref 12–45)
BASOPHILS # BLD AUTO: 0.4 % (ref 0–1.8)
BASOPHILS # BLD: 0.04 K/UL (ref 0–0.12)
BILIRUB SERPL-MCNC: 1.1 MG/DL (ref 0.1–1.5)
BUN SERPL-MCNC: 8 MG/DL (ref 8–22)
CALCIUM SERPL-MCNC: 9.5 MG/DL (ref 8.5–10.5)
CHLORIDE SERPL-SCNC: 104 MMOL/L (ref 96–112)
CO2 SERPL-SCNC: 22 MMOL/L (ref 20–33)
CREAT SERPL-MCNC: 0.58 MG/DL (ref 0.5–1.4)
EOSINOPHIL # BLD AUTO: 0.03 K/UL (ref 0–0.51)
EOSINOPHIL NFR BLD: 0.3 % (ref 0–6.9)
ERYTHROCYTE [DISTWIDTH] IN BLOOD BY AUTOMATED COUNT: 43.6 FL (ref 35.9–50)
FLUAV RNA SPEC QL NAA+PROBE: NEGATIVE
FLUBV RNA SPEC QL NAA+PROBE: NEGATIVE
GLOBULIN SER CALC-MCNC: 3.4 G/DL (ref 1.9–3.5)
GLUCOSE SERPL-MCNC: 109 MG/DL (ref 65–99)
HCT VFR BLD AUTO: 42.5 % (ref 42–52)
HGB BLD-MCNC: 15.1 G/DL (ref 14–18)
IMM GRANULOCYTES # BLD AUTO: 0.01 K/UL (ref 0–0.11)
IMM GRANULOCYTES NFR BLD AUTO: 0.1 % (ref 0–0.9)
INR PPP: 1.61 (ref 0.87–1.13)
LYMPHOCYTES # BLD AUTO: 1.47 K/UL (ref 1–4.8)
LYMPHOCYTES NFR BLD: 16.2 % (ref 22–41)
MCH RBC QN AUTO: 34.9 PG (ref 27–33)
MCHC RBC AUTO-ENTMCNC: 35.5 G/DL (ref 33.7–35.3)
MCV RBC AUTO: 98.2 FL (ref 81.4–97.8)
MONOCYTES # BLD AUTO: 0.88 K/UL (ref 0–0.85)
MONOCYTES NFR BLD AUTO: 9.7 % (ref 0–13.4)
NEUTROPHILS # BLD AUTO: 6.64 K/UL (ref 1.82–7.42)
NEUTROPHILS NFR BLD: 73.3 % (ref 44–72)
NRBC # BLD AUTO: 0 K/UL
NRBC BLD-RTO: 0 /100 WBC
PLATELET # BLD AUTO: 352 K/UL (ref 164–446)
PMV BLD AUTO: 8.8 FL (ref 9–12.9)
POTASSIUM SERPL-SCNC: 4 MMOL/L (ref 3.6–5.5)
PROT SERPL-MCNC: 7.4 G/DL (ref 6–8.2)
PROTHROMBIN TIME: 19.7 SEC (ref 12–14.6)
RBC # BLD AUTO: 4.33 M/UL (ref 4.7–6.1)
SODIUM SERPL-SCNC: 139 MMOL/L (ref 135–145)
WBC # BLD AUTO: 9.1 K/UL (ref 4.8–10.8)

## 2020-02-29 PROCEDURE — 85025 COMPLETE CBC W/AUTO DIFF WBC: CPT

## 2020-02-29 PROCEDURE — 85610 PROTHROMBIN TIME: CPT

## 2020-02-29 PROCEDURE — 87502 INFLUENZA DNA AMP PROBE: CPT

## 2020-02-29 PROCEDURE — 80053 COMPREHEN METABOLIC PANEL: CPT

## 2020-02-29 PROCEDURE — 99284 EMERGENCY DEPT VISIT MOD MDM: CPT

## 2020-02-29 PROCEDURE — 71045 X-RAY EXAM CHEST 1 VIEW: CPT

## 2020-02-29 RX ORDER — DOXYCYCLINE 100 MG/1
100 CAPSULE ORAL 2 TIMES DAILY
Qty: 14 CAP | Refills: 0 | Status: SHIPPED | OUTPATIENT
Start: 2020-02-29 | End: 2020-03-07

## 2020-02-29 NOTE — ED TRIAGE NOTES
Pt amb to triage. Mask applied to pt.  Chief Complaint   Patient presents with   • Body Aches   • Cough   • N/V   • Malaise     Symptoms x1wk.

## 2020-02-29 NOTE — ED NOTES
Break rn note: Pt discharge home. Pt given discharge instructions and prescription. Pt verbalized understanding, all questions answered ,vss upon d/c. Pt steady on feet upon discharge

## 2020-02-29 NOTE — ED PROVIDER NOTES
ED Provider Note    CHIEF COMPLAINT  Chief Complaint   Patient presents with   • Body Aches   • Cough   • N/V   • Malaise       HPI  Devon Martinez is a 44 y.o. male who presents evaluation of cough congestion body aches weight loss.  The patient has a medical history including factor V deficiency.  He unfortunately underwent left below the knee amputation due to complications of clotting disorder.  He is on Coumadin.  He does smoke cigarettes.  He denies productive cough but has had body aches and mild cough.  He has been unable to check his INR lately due to feeling poorly.  No associated or recent travel.  No other symptoms reported such as chest pain high fevers headache or neck stiffness    REVIEW OF SYSTEMS  See HPI for further details.  No night sweats weight loss numbness tingling weakness rash all other systems are negative.     PAST MEDICAL HISTORY  Past Medical History:   Diagnosis Date   • Dyslipidemia    • Factor V deficiency (HCC)     Dr. Bloch   • FREDRICK (generalized anxiety disorder)    • HTN (hypertension)    • Panic attacks        FAMILY HISTORY  Factor V Leiden    SOCIAL HISTORY  Social History     Socioeconomic History   • Marital status:      Spouse name: Not on file   • Number of children: 4   • Years of education: Not on file   • Highest education level: Not on file   Occupational History   • Occupation: dispatch     Employer: [employer]   Social Needs   • Financial resource strain: Not on file   • Food insecurity     Worry: Not on file     Inability: Not on file   • Transportation needs     Medical: Not on file     Non-medical: Not on file   Tobacco Use   • Smoking status: Former Smoker     Packs/day: 1.00     Years: 20.00     Pack years: 20.00     Types: Cigarettes   • Smokeless tobacco: Never Used   • Tobacco comment: quit in 2011   Substance and Sexual Activity   • Alcohol use: Yes     Alcohol/week: 0.0 oz     Comment: 4-5 beers/day   • Drug use: No   • Sexual activity: Yes      Partners: Female   Lifestyle   • Physical activity     Days per week: Not on file     Minutes per session: Not on file   • Stress: Not on file   Relationships   • Social connections     Talks on phone: Not on file     Gets together: Not on file     Attends Alevism service: Not on file     Active member of club or organization: Not on file     Attends meetings of clubs or organizations: Not on file     Relationship status: Not on file   • Intimate partner violence     Fear of current or ex partner: Not on file     Emotionally abused: Not on file     Physically abused: Not on file     Forced sexual activity: Not on file   Other Topics Concern   • Not on file   Social History Narrative   • Not on file       SURGICAL HISTORY  Past Surgical History:   Procedure Laterality Date   • DENTAL EXTRACTION(S)  11/22/2011    Performed by KARINA ROBERSON at SURGERY [facility]   • KNEE AMPUTATION BELOW  12/3/2009    Performed by JOSELUIS OLIVEIRA at [facility]   • ANGIOGRAM  12/2/2009    Performed by JOSELUIS OLIVEIRA at SURGERY [facility] ORS   • FEMORAL TIBIAL BYPASS  12/2/2009    Performed by JOSELUIS OLIVEIRA at SURGERY [facility]   • FEMORAL EMBOLECTOMY  11/30/2009    Performed by JOSELUIS OLIVEIRA at [facility]   • ARTERY EXPLORATION OR REPAIR  11/30/2009    Performed by JOSELUIS OLIVEIRA at [facility]   • THROMBECTOMY  11/30/2009    Performed by JOSELUIS OLIVEIRA at [facility]       CURRENT MEDICATIONS  No current facility-administered medications for this encounter.     Current Outpatient Medications:   •  atorvastatin (LIPITOR) 10 MG Tab, Take 1 Tab by mouth every bedtime., Disp: 90 Tab, Rfl: 1  •  sertraline (ZOLOFT) 50 MG Tab, Take 1 Tab by mouth every day., Disp: 90 Tab, Rfl: 1  •  lisinopril (PRINIVIL) 30 MG tablet, Take 1 Tab by mouth every day., Disp: 90 Tab, Rfl: 1  •  warfarin (COUMADIN) 5 MG Tab, Take 1 Tab by mouth every day., Disp: 90 Tab, Rfl:  "1    ALLERGIES  Allergies   Allergen Reactions   • Pcn [Penicillins] Hives     RXN=>15 years ago       PHYSICAL EXAM  VITAL SIGNS: /92   Pulse (!) 107   Temp 36.4 °C (97.6 °F) (Temporal)   Resp 18   Ht 1.803 m (5' 11\")   Wt 71 kg (156 lb 8.4 oz)   SpO2 92%   BMI 21.83 kg/m²       Constitutional: Well developed, Well nourished, No acute distress, Non-toxic appearance.   HENT: Normocephalic, Atraumatic, Bilateral external ears normal, Oropharynx moist, No oral exudates, Nose normal.   Eyes: PERRLA, EOMI, Conjunctiva normal, No discharge.   Neck: Normal range of motion, No tenderness, Supple, No stridor.   Cardiovascular: Mild tachycardia, Normal rhythm, No murmurs, No rubs, No gallops.   Thorax & Lungs: Minimal scattered rhonchi no wheezing no increased work of breathing no retractions  Abdomen: Bowel sounds normal, Soft, No tenderness, No masses, No pulsatile masses.   Skin: Warm, Dry, No erythema, No rash.   Back: No tenderness, No CVA tenderness.   Extremities: Left below the knee amputation  Neurologic: Alert & oriented x 3, Normal motor function, Normal sensory function, No focal deficits noted.   Psychiatric: Anxious      COURSE & MEDICAL DECISION MAKING  Pertinent Labs & Imaging studies reviewed. (See chart for details)  DX-CHEST-PORTABLE (1 VIEW)   Final Result      No radiographic evidence of acute cardiopulmonary process.        Results for orders placed or performed during the hospital encounter of 02/29/20   Influenza A/B By PCR (Adult - Flu Only)   Result Value Ref Range    Influenza virus A RNA Negative Negative    Influenza virus B, PCR Negative Negative   PT/INR   Result Value Ref Range    PT 19.7 (H) 12.0 - 14.6 sec    INR 1.61 (H) 0.87 - 1.13   CBC WITH DIFFERENTIAL   Result Value Ref Range    WBC 9.1 4.8 - 10.8 K/uL    RBC 4.33 (L) 4.70 - 6.10 M/uL    Hemoglobin 15.1 14.0 - 18.0 g/dL    Hematocrit 42.5 42.0 - 52.0 %    MCV 98.2 (H) 81.4 - 97.8 fL    MCH 34.9 (H) 27.0 - 33.0 pg    MCHC " 35.5 (H) 33.7 - 35.3 g/dL    RDW 43.6 35.9 - 50.0 fL    Platelet Count 352 164 - 446 K/uL    MPV 8.8 (L) 9.0 - 12.9 fL    Neutrophils-Polys 73.30 (H) 44.00 - 72.00 %    Lymphocytes 16.20 (L) 22.00 - 41.00 %    Monocytes 9.70 0.00 - 13.40 %    Eosinophils 0.30 0.00 - 6.90 %    Basophils 0.40 0.00 - 1.80 %    Immature Granulocytes 0.10 0.00 - 0.90 %    Nucleated RBC 0.00 /100 WBC    Neutrophils (Absolute) 6.64 1.82 - 7.42 K/uL    Lymphs (Absolute) 1.47 1.00 - 4.80 K/uL    Monos (Absolute) 0.88 (H) 0.00 - 0.85 K/uL    Eos (Absolute) 0.03 0.00 - 0.51 K/uL    Baso (Absolute) 0.04 0.00 - 0.12 K/uL    Immature Granulocytes (abs) 0.01 0.00 - 0.11 K/uL    NRBC (Absolute) 0.00 K/uL   Comp Metabolic Panel   Result Value Ref Range    Sodium 139 135 - 145 mmol/L    Potassium 4.0 3.6 - 5.5 mmol/L    Chloride 104 96 - 112 mmol/L    Co2 22 20 - 33 mmol/L    Anion Gap 13.0 (H) 0.0 - 11.9    Glucose 109 (H) 65 - 99 mg/dL    Bun 8 8 - 22 mg/dL    Creatinine 0.58 0.50 - 1.40 mg/dL    Calcium 9.5 8.5 - 10.5 mg/dL    AST(SGOT) 24 12 - 45 U/L    ALT(SGPT) 13 2 - 50 U/L    Alkaline Phosphatase 69 30 - 99 U/L    Total Bilirubin 1.1 0.1 - 1.5 mg/dL    Albumin 4.0 3.2 - 4.9 g/dL    Total Protein 7.4 6.0 - 8.2 g/dL    Globulin 3.4 1.9 - 3.5 g/dL    A-G Ratio 1.2 g/dL   ESTIMATED GFR   Result Value Ref Range    GFR If African American >60 >60 mL/min/1.73 m 2    GFR If Non African American >60 >60 mL/min/1.73 m 2      Patient presents here with cough congestion.  His laboratory studies are reassuring and he has no obvious pneumonia.  He has borderline hypoxia and he is a smoker.  With his history I do feel that antibiotics are indicated.  Influenza a and B is both negative.  FINAL IMPRESSION  1.  Lower respiratory infection    Electronically signed by: Ryan Herrera M.D., 2/29/2020 1:33 PM

## 2020-03-05 ENCOUNTER — TELEPHONE (OUTPATIENT)
Dept: MEDICAL GROUP | Facility: PHYSICIAN GROUP | Age: 45
End: 2020-03-05

## 2020-03-05 NOTE — TELEPHONE ENCOUNTER
Please tell patient if he is not feeling better to come in for follow-up.  He is supposed to go to anticoagulation clinic to manage his Coumadin.  He no showed his appointment on 2/20/2020.  Please have him call [phone] to reschedule his appointment so they can manage his Coumadin dose.

## 2020-03-05 NOTE — TELEPHONE ENCOUNTER
Karli called and is letting you know that he went to the ER and not feeling good.  He also had his INT taken while there and it was 1.6.

## 2020-03-06 NOTE — TELEPHONE ENCOUNTER
This patient does not want to listen to what is being told to him,  Very argumentative.  When you try to explain he acts like you do not know what is going on with him.  He is not staying in contact with the coumadin clinic about his INR.  He was asking about the injectable for his INR so he does not have to do any labs as he does not have the time to be running up here for appointmnets.

## 2020-03-26 ENCOUNTER — TELEPHONE (OUTPATIENT)
Dept: VASCULAR LAB | Facility: MEDICAL CENTER | Age: 45
End: 2020-03-26

## 2020-03-26 ENCOUNTER — APPOINTMENT (OUTPATIENT)
Dept: VASCULAR LAB | Facility: MEDICAL CENTER | Age: 45
End: 2020-03-26
Attending: FAMILY MEDICINE
Payer: COMMERCIAL

## 2020-03-26 NOTE — TELEPHONE ENCOUNTER
Patient referred for evaluation and management in the vascular care clinic.   Unfortunately patient missed appointment for initial visit in our office today.  We will be unable to take part in care until or unless patient makes an appointment for a face-to-face visit in our center  We will ask our  or medical assistant to call and reschedule     Pending further patient contact, we will defer all vascular care, including management of cardiovascular risk factors, to PCP and other members of the care team    Bryan Boucher M.D.  [facility]

## 2020-03-26 NOTE — TELEPHONE ENCOUNTER
Pt stated he was not able to come in because he was not feeling well, and also grieving brother whom recently passed.   He asked that we reach out again in 2 or 3 weeks

## 2020-04-13 ENCOUNTER — TELEPHONE (OUTPATIENT)
Dept: HEALTH INFORMATION MANAGEMENT | Facility: OTHER | Age: 45
End: 2020-04-13

## 2020-04-13 ENCOUNTER — TELEMEDICINE (OUTPATIENT)
Dept: MEDICAL GROUP | Facility: PHYSICIAN GROUP | Age: 45
End: 2020-04-13
Payer: COMMERCIAL

## 2020-04-13 VITALS — WEIGHT: 160 LBS | HEIGHT: 71 IN | BODY MASS INDEX: 22.4 KG/M2

## 2020-04-13 DIAGNOSIS — J20.9 ACUTE BRONCHITIS, UNSPECIFIED ORGANISM: ICD-10-CM

## 2020-04-13 PROCEDURE — 99214 OFFICE O/P EST MOD 30 MIN: CPT | Mod: 95,CR | Performed by: FAMILY MEDICINE

## 2020-04-13 RX ORDER — AZITHROMYCIN 250 MG/1
TABLET, FILM COATED ORAL
Qty: 6 TAB | Refills: 0 | Status: SHIPPED | OUTPATIENT
Start: 2020-04-13 | End: 2020-11-24

## 2020-04-13 ASSESSMENT — FIBROSIS 4 INDEX: FIB4 SCORE: 0.85

## 2020-04-13 NOTE — TELEPHONE ENCOUNTER
1. Caller Name:Devon Martinez  Call Back Number:[phone]  [facility] PCP or Specialty Provider: Yes           2.  Does patient have any active symptoms of respiratory illness (fever OR cough OR shortness of breath OR sore throat)? Yes, the patient reports the following respiratory symptoms: cough, shortness of breath and fatigue, smoker, states thinks the sob and cough is from smoking    3.  Does patient have any comoribidities? None     4.  Has the patient traveled in the last 14 days OR had any known contact with someone who is suspected or confirmed to have COVID-19?  No.      5. Disposition: Offered patient virtual visit to limit potential exposure to others; patient also given self care instructions    Note routed to [facility] Provider: PAULO only.

## 2020-04-13 NOTE — PROGRESS NOTES
Telemedicine Visit: Established Patient     This encounter was conducted via [employer]  Verbal consent was obtained. Patient's identity was verified.    Subjective:   CC:   Devon Martinez is a 44 y.o. male presenting for evaluation and management of:    He said for the last 2 weeks he has been having cough with clear/whitish phlegm, low energy level, poor appetite, sweating and chills.  He denies any fever.  He denies any body aches.  Denies any anosmia.  He said he has soft stools 2 times per day.  Denies any blood in the stool.  No history of travel outside the area.  No history of exposure to suspected COVID-19 or confirm COVID-19 case.  He said he has not worked since April 6 because of the illness.  He needs a work note to excuse him otherwise they will terminate him.    On 2/29/2020, he was seen at the ER for lower respiratory infection.  At that time he was having nonproductive cough, congestion, body aches and weight loss.  Chest x-ray came back no acute cardiopulmonary abnormality.  He was treated with Zithromax Z-NICHO.  He said all his symptoms resolved at that time.    ROS   As per above.  Denies any recent fevers.  Positive chills and sweating.  No nausea or vomiting. No chest pains or shortness of breath.     Allergies   Allergen Reactions   • Pcn [Penicillins] Hives     RXN=>15 years ago       Current medicines (including changes today)  Current Outpatient Medications   Medication Sig Dispense Refill   • atorvastatin (LIPITOR) 10 MG Tab Take 1 Tab by mouth every bedtime. 90 Tab 1   • sertraline (ZOLOFT) 50 MG Tab Take 1 Tab by mouth every day. 90 Tab 1   • lisinopril (PRINIVIL) 30 MG tablet Take 1 Tab by mouth every day. 90 Tab 1   • warfarin (COUMADIN) 5 MG Tab Take 1 Tab by mouth every day. 90 Tab 1     No current facility-administered medications for this visit.        Patient Active Problem List    Diagnosis Date Noted   • Factor V deficiency (HCC) 11/30/2009     Priority: High   • History of head  "injury 12/29/2016   • PAD (peripheral artery disease) (HCC) 12/17/2015   • FREDRICK (generalized anxiety disorder)    • Panic attacks    • HTN (hypertension)    • Dyslipidemia        Family History   Problem Relation Age of Onset   • Clotting Disorder Mother         Factor V leiden def   • Psychiatric Illness Mother         Bipolar d/o   • Stroke Father    • Heart Disease Father         A.Fib   • Clotting Disorder Brother         factor v leiden def   • Clotting Disorder Brother         factor v leiden def       He  has a past medical history of Dyslipidemia, Factor V deficiency (HCC), FREDRICK (generalized anxiety disorder), HTN (hypertension), and Panic attacks. He also has no past medical history of Asthma, CAD (coronary artery disease), Cancer (HCC), Chronic obstructive pulmonary disease (HCC), Congestive heart failure (HCC), Diabetes (HCC), Infectious disease, Liver disease, Renal disorder, Seizure disorder (HCC), or Stroke (HCC).  He  has a past surgical history that includes femoral embolectomy (11/30/2009); artery exploration or repair (11/30/2009); thrombectomy (11/30/2009); angiogram (12/2/2009); femoral tibial bypass (12/2/2009); 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       Objective:   Vitals obtained by patient    Weight 72.6 kg (160 lb)    Height 1.80 3 m (5'11\")     Observed respiratory rate 14 to 16/min        Physical Exam:  Constitutional: Alert, no distress, well-groomed.  Skin: No rashes in visible areas.  Eye: Round. Conjunctiva clear, lids normal. No icterus.   ENMT: Lips pink without lesions, good dentition, moist mucous membranes. Phonation normal.  Neck: No masses, no thyromegaly. Moves freely without pain.  CV: Pulse as reported by patient  Respiratory: Unlabored respiratory effort, no cough or audible wheeze  Psych: Alert and oriented x3, normal affect and mood.       Assessment and Plan:   The following treatment plan was discussed:     1. Acute bronchitis, " unspecified organism  azithromycin (ZITHROMAX) 250 MG Tab   We will treat for acute bronchitis with Zithromax Z-NICHO.  This medication works for him when he had the same symptoms about 1-1/2 months ago.  Advised increase p.o. fluids and rest.  Work note was given to excuse him starting April 6-15.  Return to work on 4/16/2020.  He has an appointment to see me in 2 weeks that appointment so we can reevaluate him.      Follow-up: Follow-up as scheduled on 4/29/2020.      Please note that this dictation was created using voice recognition software. I have worked with consultants from the vendor as well as technical experts from [employer] [facility]  [employer] to optimize the interface. I have made every reasonable attempt to correct obvious errors, but I expect that there are errors of grammar and possibly content I did not discover before finalizing the note.

## 2020-04-13 NOTE — LETTER
April 13, 2020         Patient: Devon Martinez   YOB: 1975   Date of Visit: 4/13/2020           To Whom it May Concern:    Devon Martinez was seen in my clinic for virtual visit on 4/13/2020. He is excused from work on 4/6-15/2020 due to illness.  Patient may return to work on 4/16/2020 without restrictions.    If you have any questions or concerns, please don't hesitate to call.        Sincerely,           Nicolasa Ramey M.D.  Electronically Signed

## 2020-04-27 ENCOUNTER — TELEPHONE (OUTPATIENT)
Dept: HEALTH INFORMATION MANAGEMENT | Facility: OTHER | Age: 45
End: 2020-04-27

## 2020-04-27 NOTE — TELEPHONE ENCOUNTER
1. Caller Name: Devon Martinez                        Call Back Number: [phone]  [facility] PCP or Specialty Provider: Pipe Ramey        2.  Does patient have any active symptoms of respiratory illness (fever OR cough OR shortness of breath OR sore throat)? Yes, the patient reports the following respiratory symptoms: cough.    3.  Does patient have any comoribidities? Immunosuppressed  Factor 5/Coumadin      4.  Has the patient traveled in the last 14 days OR had any known contact with someone who is suspected or confirmed to have COVID-19?  No.    5. Disposition: Advised to perform self care, monitor for worsening symptoms and to call back in 3 days if no improvement     Pt given z-pac on 2/29/2020 for LRII symptoms.  Cough is resolving and patient reports feeling much better.  He did report  He smokes cigarettes.      Patient called to cancel appt.  He stated he would call back to reschedule    Note routed to [facility] Provider: PAULO only.      .

## 2020-11-03 DIAGNOSIS — D68.2 FACTOR V DEFICIENCY (HCC): ICD-10-CM

## 2020-11-03 DIAGNOSIS — F41.1 GAD (GENERALIZED ANXIETY DISORDER): ICD-10-CM

## 2020-11-04 ENCOUNTER — TELEPHONE (OUTPATIENT)
Dept: MEDICAL GROUP | Facility: PHYSICIAN GROUP | Age: 45
End: 2020-11-04

## 2020-11-04 DIAGNOSIS — I10 ESSENTIAL HYPERTENSION: ICD-10-CM

## 2020-11-04 DIAGNOSIS — D68.2 FACTOR V DEFICIENCY (HCC): ICD-10-CM

## 2020-11-04 DIAGNOSIS — E78.5 DYSLIPIDEMIA: ICD-10-CM

## 2020-11-04 RX ORDER — WARFARIN SODIUM 5 MG/1
TABLET ORAL
Qty: 30 TAB | Refills: 0 | Status: SHIPPED | OUTPATIENT
Start: 2020-11-04 | End: 2020-11-24 | Stop reason: SDUPTHER

## 2020-11-04 NOTE — TELEPHONE ENCOUNTER
Received request via: Pharmacy    Was the patient seen in the last year in this department? Yes    Does the patient have an active prescription (recently filled or refills available) for medication(s) requested? No       Pharmacy is requesting 90 day supplies

## 2020-11-05 NOTE — TELEPHONE ENCOUNTER
Spoke with patient.  He is asking for refills of his medications which are sertraline and Coumadin.  The last INR was in February 2020.  Patient has a history of noncompliance with getting INRs, going to the Coumadin clinic, follow-up appointment with me.  Advised to make an appointment and to do INR including regular blood work.  He was given an appointment for this month to see me.  He was advised to call [phone] to make appointment for the lab.  Only 30-day supply given for his meds.

## 2020-11-13 ENCOUNTER — HOSPITAL ENCOUNTER (OUTPATIENT)
Dept: LAB | Facility: MEDICAL CENTER | Age: 45
End: 2020-11-13
Attending: FAMILY MEDICINE
Payer: COMMERCIAL

## 2020-11-13 DIAGNOSIS — Z89.512 S/P BKA (BELOW KNEE AMPUTATION), LEFT (HCC): ICD-10-CM

## 2020-11-13 DIAGNOSIS — D68.2 FACTOR V DEFICIENCY (HCC): ICD-10-CM

## 2020-11-13 DIAGNOSIS — I10 ESSENTIAL HYPERTENSION: ICD-10-CM

## 2020-11-13 DIAGNOSIS — F41.1 GAD (GENERALIZED ANXIETY DISORDER): ICD-10-CM

## 2020-11-13 DIAGNOSIS — E78.5 DYSLIPIDEMIA: ICD-10-CM

## 2020-11-13 LAB
ALBUMIN SERPL BCP-MCNC: 4 G/DL (ref 3.2–4.9)
ALBUMIN/GLOB SERPL: 1.4 G/DL
ALP SERPL-CCNC: 70 U/L (ref 30–99)
ALT SERPL-CCNC: 23 U/L (ref 2–50)
ANION GAP SERPL CALC-SCNC: 7 MMOL/L (ref 7–16)
AST SERPL-CCNC: 31 U/L (ref 12–45)
BASOPHILS # BLD AUTO: 1.4 % (ref 0–1.8)
BASOPHILS # BLD: 0.06 K/UL (ref 0–0.12)
BILIRUB SERPL-MCNC: 0.3 MG/DL (ref 0.1–1.5)
BUN SERPL-MCNC: 19 MG/DL (ref 8–22)
CALCIUM SERPL-MCNC: 8.8 MG/DL (ref 8.5–10.5)
CHLORIDE SERPL-SCNC: 104 MMOL/L (ref 96–112)
CHOLEST SERPL-MCNC: 217 MG/DL (ref 100–199)
CO2 SERPL-SCNC: 28 MMOL/L (ref 20–33)
CREAT SERPL-MCNC: 0.71 MG/DL (ref 0.5–1.4)
EOSINOPHIL # BLD AUTO: 0.09 K/UL (ref 0–0.51)
EOSINOPHIL NFR BLD: 2.2 % (ref 0–6.9)
ERYTHROCYTE [DISTWIDTH] IN BLOOD BY AUTOMATED COUNT: 50.4 FL (ref 35.9–50)
FASTING STATUS PATIENT QL REPORTED: NORMAL
GLOBULIN SER CALC-MCNC: 2.8 G/DL (ref 1.9–3.5)
GLUCOSE SERPL-MCNC: 75 MG/DL (ref 65–99)
HCT VFR BLD AUTO: 41.4 % (ref 42–52)
HDLC SERPL-MCNC: 74 MG/DL
HGB BLD-MCNC: 13.7 G/DL (ref 14–18)
IMM GRANULOCYTES # BLD AUTO: 0.01 K/UL (ref 0–0.11)
IMM GRANULOCYTES NFR BLD AUTO: 0.2 % (ref 0–0.9)
LDLC SERPL CALC-MCNC: 102 MG/DL
LYMPHOCYTES # BLD AUTO: 1.23 K/UL (ref 1–4.8)
LYMPHOCYTES NFR BLD: 29.4 % (ref 22–41)
MCH RBC QN AUTO: 36.6 PG (ref 27–33)
MCHC RBC AUTO-ENTMCNC: 33.1 G/DL (ref 33.7–35.3)
MCV RBC AUTO: 110.7 FL (ref 81.4–97.8)
MONOCYTES # BLD AUTO: 0.42 K/UL (ref 0–0.85)
MONOCYTES NFR BLD AUTO: 10 % (ref 0–13.4)
NEUTROPHILS # BLD AUTO: 2.37 K/UL (ref 1.82–7.42)
NEUTROPHILS NFR BLD: 56.8 % (ref 44–72)
NRBC # BLD AUTO: 0 K/UL
NRBC BLD-RTO: 0 /100 WBC
PLATELET # BLD AUTO: 324 K/UL (ref 164–446)
PMV BLD AUTO: 9.4 FL (ref 9–12.9)
POTASSIUM SERPL-SCNC: 3.7 MMOL/L (ref 3.6–5.5)
PROT SERPL-MCNC: 6.8 G/DL (ref 6–8.2)
RBC # BLD AUTO: 3.74 M/UL (ref 4.7–6.1)
SODIUM SERPL-SCNC: 139 MMOL/L (ref 135–145)
TRIGL SERPL-MCNC: 204 MG/DL (ref 0–149)
WBC # BLD AUTO: 4.2 K/UL (ref 4.8–10.8)

## 2020-11-13 PROCEDURE — 36415 COLL VENOUS BLD VENIPUNCTURE: CPT

## 2020-11-13 PROCEDURE — 80061 LIPID PANEL: CPT

## 2020-11-13 PROCEDURE — 85025 COMPLETE CBC W/AUTO DIFF WBC: CPT

## 2020-11-13 PROCEDURE — 85610 PROTHROMBIN TIME: CPT

## 2020-11-13 PROCEDURE — 80053 COMPREHEN METABOLIC PANEL: CPT

## 2020-11-18 ENCOUNTER — TELEPHONE (OUTPATIENT)
Dept: MEDICAL GROUP | Facility: PHYSICIAN GROUP | Age: 45
End: 2020-11-18

## 2020-11-19 ENCOUNTER — APPOINTMENT (OUTPATIENT)
Dept: MEDICAL GROUP | Facility: PHYSICIAN GROUP | Age: 45
End: 2020-11-19
Payer: COMMERCIAL

## 2020-11-19 ENCOUNTER — HOSPITAL ENCOUNTER (OUTPATIENT)
Dept: LAB | Facility: MEDICAL CENTER | Age: 45
End: 2020-11-19
Attending: FAMILY MEDICINE
Payer: COMMERCIAL

## 2020-11-19 LAB
INR PPP: 1.75 (ref 0.87–1.13)
PROTHROMBIN TIME: 20.9 SEC (ref 12–14.6)

## 2020-11-19 PROCEDURE — 85610 PROTHROMBIN TIME: CPT

## 2020-11-20 ENCOUNTER — TELEPHONE (OUTPATIENT)
Dept: MEDICAL GROUP | Facility: PHYSICIAN GROUP | Age: 45
End: 2020-11-20

## 2020-11-20 NOTE — TELEPHONE ENCOUNTER
Phone Number Called: [phone] (home)       Call outcome: Spoke to patient regarding message below.    Message: Patient notified of INR result and change in coumadin dosing. Pt verbalized understanding.

## 2020-11-20 NOTE — TELEPHONE ENCOUNTER
----- Message from Nicolasa Ramey M.D. sent at 11/20/2020 10:10 AM PST -----  Please inform patient that his INR is at 1.75 which is below the target range of 2-3.  Please have him increase his Coumadin dose 5mg 1 tablet Monday to Friday and half a tablet (2.5 mg) Saturday and Sunday (current dose 5 mg Monday Wednesday Friday, 2.5 mg Tuesday Thursday Saturday and Sunday).  Keep appointment as scheduled next week.

## 2020-11-24 ENCOUNTER — OFFICE VISIT (OUTPATIENT)
Dept: MEDICAL GROUP | Facility: PHYSICIAN GROUP | Age: 45
End: 2020-11-24
Payer: COMMERCIAL

## 2020-11-24 VITALS
WEIGHT: 166.45 LBS | BODY MASS INDEX: 23.3 KG/M2 | DIASTOLIC BLOOD PRESSURE: 80 MMHG | OXYGEN SATURATION: 94 % | TEMPERATURE: 98.4 F | HEIGHT: 71 IN | HEART RATE: 90 BPM | SYSTOLIC BLOOD PRESSURE: 154 MMHG

## 2020-11-24 DIAGNOSIS — Z23 NEED FOR IMMUNIZATION AGAINST INFLUENZA: ICD-10-CM

## 2020-11-24 DIAGNOSIS — I10 ESSENTIAL HYPERTENSION: ICD-10-CM

## 2020-11-24 DIAGNOSIS — Z89.512 S/P BKA (BELOW KNEE AMPUTATION), LEFT (HCC): ICD-10-CM

## 2020-11-24 DIAGNOSIS — D53.9 MACROCYTIC ANEMIA: ICD-10-CM

## 2020-11-24 DIAGNOSIS — F41.1 GAD (GENERALIZED ANXIETY DISORDER): ICD-10-CM

## 2020-11-24 DIAGNOSIS — E78.5 DYSLIPIDEMIA: ICD-10-CM

## 2020-11-24 DIAGNOSIS — D68.2 FACTOR V DEFICIENCY (HCC): ICD-10-CM

## 2020-11-24 PROCEDURE — 99214 OFFICE O/P EST MOD 30 MIN: CPT | Mod: 25 | Performed by: FAMILY MEDICINE

## 2020-11-24 PROCEDURE — 90471 IMMUNIZATION ADMIN: CPT | Performed by: FAMILY MEDICINE

## 2020-11-24 PROCEDURE — 90686 IIV4 VACC NO PRSV 0.5 ML IM: CPT | Performed by: FAMILY MEDICINE

## 2020-11-24 RX ORDER — WARFARIN SODIUM 5 MG/1
5 TABLET ORAL
Qty: 90 TAB | Refills: 1 | Status: SHIPPED | OUTPATIENT
Start: 2020-11-24 | End: 2021-10-27 | Stop reason: SDUPTHER

## 2020-11-24 RX ORDER — LISINOPRIL 30 MG/1
30 TABLET ORAL DAILY
Qty: 90 TAB | Refills: 1 | Status: SHIPPED | OUTPATIENT
Start: 2020-11-24 | End: 2021-08-19 | Stop reason: SDUPTHER

## 2020-11-24 RX ORDER — ATORVASTATIN CALCIUM 10 MG/1
10 TABLET, FILM COATED ORAL
Qty: 90 TAB | Refills: 1 | Status: SHIPPED | OUTPATIENT
Start: 2020-11-24

## 2020-11-24 ASSESSMENT — FIBROSIS 4 INDEX: FIB4 SCORE: 0.92

## 2020-11-24 NOTE — PROGRESS NOTES
Subjective:      Devon Martinez is a 45 y.o. male who presents with Hypertension and Medication Refill (Lisinopril)            HPI     Patient is here for follow-up.    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  He finally got PT/INR done last week after the last 1 which was a while ago in February 2020.  The INR came back slightly below the target which was at 1.75 so we increase the dose of the warfarin to 5 mg Monday to Friday and 2.5 mg Saturday and Sunday 4 days ago.  He has been previously referred to anticoagulation clinic but has not been able to get in due to conflict with his work schedule.  He has been noncompliant with getting his INR regularly which is because of his work schedule conflict.    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  He continues to wear a prosthesis.  He said he has ongoing phantom pain for which he smokes marijuana.  He was previously on hydrocodone/APAP to manage the pain but he was able to get off the medication successfully.  He is getting marijuana from the local dispensary.  He has no problem with the prosthesis at this time.    In terms of his hypertension, he ran out of his blood pressure medication lisinopril a month ago.  His blood pressure is now slightly elevated without the medication.    For his dyslipidemia, he said he has been out of his simvastatin for about 3 months now.  We did a follow-up blood work before this visit.    He continues to take sertraline for FREDRICK with good results.  He said his girlfriend mentioned that he needs evaluation for bipolar disorder.  On further questioning he denies cycling between depression episodes and hypomanic/manic episodes.    He needs a flu shot today.    Past medical history, past surgical history, family history reviewed-no changes    Social history reviewed-no changes    Allergies reviewed-no changes    Medications reviewed-no  "changes        ROS     Per HPI, the rest are negative.       Objective:     /80 (BP Location: Left arm, Patient Position: Sitting, BP Cuff Size: Adult)   Pulse 90   Temp 36.9 °C (98.4 °F) (Temporal)   Ht 1.803 m (5' 11\")   Wt 75.5 kg (166 lb 7.2 oz)   SpO2 94%   BMI 23.21 kg/m²      Physical Exam      Examined alert, awake, oriented, not in distress    Neck-supple, no lymphadenopathy, no thyromegaly  Lungs-clear to auscultation, no rales, no wheezes  Heart-regular rate and rhythm, no murmur  Extremities-no edema, clubbing, cyanosis, status post left BKA with prosthesis in place    Hospital Outpatient Visit on 11/19/2020   Component Date Value Ref Range Status   • PT 11/19/2020 20.9* 12.0 - 14.6 sec Final   • INR 11/19/2020 1.75* 0.87 - 1.13 Final    Comment: INR - Non-therapeutic Reference Range: 0.87-1.13  INR - Therapeutic Reference Range: 2.0-4.0            Results for BANDAR WOOD (MRN [id number]) as of 11/24/2020 09:55   Ref. Range 2/29/2020 14:34 11/13/2020 08:15   WBC Latest Ref Range: 4.8 - 10.8 K/uL  4.2 (L)   RBC Latest Ref Range: 4.70 - 6.10 M/uL  3.74 (L)   Hemoglobin Latest Ref Range: 14.0 - 18.0 g/dL  13.7 (L)   Hematocrit Latest Ref Range: 42.0 - 52.0 %  41.4 (L)   MCV Latest Ref Range: 81.4 - 97.8 fL  110.7 (H)   MCH Latest Ref Range: 27.0 - 33.0 pg  36.6 (H)   MCHC Latest Ref Range: 33.7 - 35.3 g/dL  33.1 (L)   RDW Latest Ref Range: 35.9 - 50.0 fL  50.4 (H)   Platelet Count Latest Ref Range: 164 - 446 K/uL  324   MPV Latest Ref Range: 9.0 - 12.9 fL  9.4   Neutrophils-Polys Latest Ref Range: 44.00 - 72.00 %  56.80   Neutrophils (Absolute) Latest Ref Range: 1.82 - 7.42 K/uL  2.37   Lymphocytes Latest Ref Range: 22.00 - 41.00 %  29.40   Lymphs (Absolute) Latest Ref Range: 1.00 - 4.80 K/uL  1.23   Monocytes Latest Ref Range: 0.00 - 13.40 %  10.00   Monos (Absolute) Latest Ref Range: 0.00 - 0.85 K/uL  0.42   Eosinophils Latest Ref Range: 0.00 - 6.90 %  2.20   Eos (Absolute) Latest Ref Range: " 0.00 - 0.51 K/uL  0.09   Basophils Latest Ref Range: 0.00 - 1.80 %  1.40   Baso (Absolute) Latest Ref Range: 0.00 - 0.12 K/uL  0.06   Immature Granulocytes Latest Ref Range: 0.00 - 0.90 %  0.20   Immature Granulocytes (abs) Latest Ref Range: 0.00 - 0.11 K/uL  0.01   Nucleated RBC Latest Units: /100 WBC  0.00   NRBC (Absolute) Latest Units: K/uL  0.00   Sodium Latest Ref Range: 135 - 145 mmol/L  139   Potassium Latest Ref Range: 3.6 - 5.5 mmol/L  3.7   Chloride Latest Ref Range: 96 - 112 mmol/L  104   Co2 Latest Ref Range: 20 - 33 mmol/L  28   Anion Gap Latest Ref Range: 7.0 - 16.0   7.0   Glucose Latest Ref Range: 65 - 99 mg/dL  75   Bun Latest Ref Range: 8 - 22 mg/dL  19   Creatinine Latest Ref Range: 0.50 - 1.40 mg/dL  0.71   GFR If  Latest Ref Range: >60 mL/min/1.73 m 2  >60   GFR If Non  Latest Ref Range: >60 mL/min/1.73 m 2  >60   Calcium Latest Ref Range: 8.5 - 10.5 mg/dL  8.8   AST(SGOT) Latest Ref Range: 12 - 45 U/L  31   ALT(SGPT) Latest Ref Range: 2 - 50 U/L  23   Alkaline Phosphatase Latest Ref Range: 30 - 99 U/L  70   Total Bilirubin Latest Ref Range: 0.1 - 1.5 mg/dL  0.3   Albumin Latest Ref Range: 3.2 - 4.9 g/dL  4.0   Total Protein Latest Ref Range: 6.0 - 8.2 g/dL  6.8   Globulin Latest Ref Range: 1.9 - 3.5 g/dL  2.8   A-G Ratio Latest Units: g/dL  1.4   Fasting Status Unknown  Fasting   Cholesterol,Tot Latest Ref Range: 100 - 199 mg/dL  217 (H)   Triglycerides Latest Ref Range: 0 - 149 mg/dL  204 (H)   HDL Latest Ref Range: >=40 mg/dL  74   LDL Latest Ref Range: <100 mg/dL  102 (H)   DX-CHEST-PORTABLE (1 VIEW) Unknown Rpt         Assessment/Plan:        1. Factor V deficiency (HCC)  We recently increase the dose of the Coumadin about 4 days ago because he is below the target range.  We will do follow-up INR next week.  I will refer him to our Coumadin clinic/anticoagulation clinic.  He said he will try to work it in with his schedule.  - warfarin (COUMADIN) 5 MG Tab;  Take 1 Tab by mouth every day.  Dispense: 90 Tab; Refill: 1    2. S/P BKA (below knee amputation), left (HCC)  Stable.    3. Essential hypertension  Elevated blood pressure reading because he ran out of the medication.  Advised not to run out of the meds and to make sure to call us for refills.  Prescription refill sent to pharmacy.  - lisinopril (PRINIVIL) 30 MG tablet; Take 1 Tab by mouth every day.  Dispense: 90 Tab; Refill: 1    4. Dyslipidemia  Triglycerides elevated, HDL cholesterol running very good, LDL slightly elevated.  He ran out of his cholesterol medication about 3 months ago.  We will restart medication.  Recheck blood work in 3 months.  - Lipid Profile; Future  - Comp Metabolic Panel; Future  - CBC WITH DIFFERENTIAL; Future  - IRON/TOTAL IRON BIND; Future  - FERRITIN; Future  - VITAMIN B12; Future  - FOLATE; Future  - atorvastatin (LIPITOR) 10 MG Tab; Take 1 Tab by mouth every bedtime.  Dispense: 90 Tab; Refill: 1    5. FREDRICK (generalized anxiety disorder)  He does not have cycling between depression and hypomanic episodes typical for bipolar disorder.  He has been doing well on sertraline.  We will keep him on the medication.  No need to refer to psychiatry for further evaluation.  - sertraline (ZOLOFT) 50 MG Tab; Take 1 Tab by mouth every day.  Dispense: 90 Tab; Refill: 1    6. Macrocytic anemia  He has mild macrocytic anemia.  He admits he consumes about 4-5 beers a day.  We will repeat blood work next visit including iron studies, vitamins B12 and folate levels.  Advised to limit alcohol use to maximum of 2 alcoholic drinks a day.  - Lipid Profile; Future  - Comp Metabolic Panel; Future  - CBC WITH DIFFERENTIAL; Future  - IRON/TOTAL IRON BIND; Future  - FERRITIN; Future  - VITAMIN B12; Future  - FOLATE; Future    7. Need for immunization against influenza  Flu shot was given.  - Influenza Vaccine Quad Injection (PF)    Follow-up in 3 months.      Please note that this dictation was created using  voice recognition software. I have worked with consultants from the vendor as well as technical experts from [facility] to optimize the interface. I have made every reasonable attempt to correct obvious errors, but I expect that there are errors of grammar and possibly content I did not discover before finalizing the note.

## 2020-11-25 DIAGNOSIS — D68.2 FACTOR V DEFICIENCY (HCC): ICD-10-CM

## 2020-12-02 ENCOUNTER — TELEPHONE (OUTPATIENT)
Dept: VASCULAR LAB | Facility: MEDICAL CENTER | Age: 45
End: 2020-12-02

## 2020-12-02 NOTE — TELEPHONE ENCOUNTER
Received anticoagulation referral for warfarin due to Factor V deficiency from Dr. Nicolasa Ramey M.D. on 11/24/2020    Standing lab order was placed last week. Pt was supposed to get INR drawn this week. LVM for pt to get INR drawn.    Fabiola Rogers, DarrenD

## 2020-12-04 ENCOUNTER — TELEPHONE (OUTPATIENT)
Dept: VASCULAR LAB | Facility: MEDICAL CENTER | Age: 45
End: 2020-12-04

## 2020-12-04 NOTE — TELEPHONE ENCOUNTER
[facility] [place] [facility]    Received anticoagulation referral for warfarin due to Factor V deficiency from Dr. Nicolasa Ramey M.D. on 11/24/2020     2nd message left for patient to try and establish care.   Standing lab order was already placed for the patient. Pt was supposed to get INR drawn this week. LVM for pt to call clinic back to establish care and to please get INR drawn.    INS: [facility]  Physician: Dr. Nicolasa Ramey ([facility])  Locations: ANY     [facility] [phone]      Tushar BanksD

## 2020-12-07 ENCOUNTER — TELEPHONE (OUTPATIENT)
Dept: VASCULAR LAB | Facility: MEDICAL CENTER | Age: 45
End: 2020-12-07

## 2020-12-07 NOTE — TELEPHONE ENCOUNTER
[facility] [place] [facility]     Received anticoagulation referral for warfarin due to Factor V deficiency from Dr. Nicolasa Ramey M.D. on 11/24/2020     3rd message left for patient to try and establish care.   Standing lab order was already placed for the patient. Pt was supposed to get INR drawn this week. LVM for pt to call clinic back to establish care and to please get INR drawn.     INS: [facility]  Physician: Dr. Nicolasa Ramey ([facility])  Locations: ANY      [facility] [phone]     Fabiola Rogers, DarrenD

## 2020-12-11 ENCOUNTER — TELEPHONE (OUTPATIENT)
Dept: VASCULAR LAB | Facility: MEDICAL CENTER | Age: 45
End: 2020-12-11

## 2020-12-11 NOTE — LETTER
Devon Martinez   [address]  [place] [facility] [address]      Dear Devon Martinez ,    We have been unsuccessful in our attempts to contact you regarding your Anticoagulation Service referral.  Anticoagulants are medications that can cause potential harmful side effects when not monitored properly. Without proper monitoring there is potential for serious, sometimes life-threatening bleeding problems or life-threatening blood clots or stroke could result.    Please contact our clinic so we may assist you.  We are open Monday-Friday 8 am until 5 pm.  You may reach our Service at [phone].    To monitor your anticoagulant effectively, we need to be able to communicate with you.  This is a requirement to be followed by our Service.  Please contact the clinic as soon as possible to establish care and provide your current contact information.  Thank you.        Sincerely,        Bryan BanksD, [facility]  Pharmacy Clinical Supervisor  [facility]  Outpatient Anticoagulation Service

## 2020-12-11 NOTE — TELEPHONE ENCOUNTER
Left voicemail message for patient to return call to [facility] to establish care      5th call  Letter sent  Warfarin      Serenity Wilcox, Clinical Pharmacist, CDE, CACP

## 2020-12-18 ENCOUNTER — TELEPHONE (OUTPATIENT)
Dept: VASCULAR LAB | Facility: MEDICAL CENTER | Age: 45
End: 2020-12-18

## 2020-12-18 NOTE — TELEPHONE ENCOUNTER
[facility] [place] [facility]     Received anticoagulation referral for warfarin due to Factor V deficiency from Dr. Nicolasa Ramey M.D. on 11/24/2020     6th message left for patient to try and establish care.   Standing lab order was already placed for the patient. Pt was supposed to get INR drawn week of 12/7. LVM for pt to call clinic back to establish care and to please get INR drawn.     INS: [facility]  Physician: Dr. Nicolasa Ramey ([facility])  Locations: ANY      [facility]ticoagulation Clinic [phone]    Kelton Mahan, DarrenD

## 2020-12-21 ENCOUNTER — TELEPHONE (OUTPATIENT)
Dept: VASCULAR LAB | Facility: MEDICAL CENTER | Age: 45
End: 2020-12-21

## 2020-12-21 NOTE — TELEPHONE ENCOUNTER
[facility] [place] [facility]     Received anticoagulation referral for warfarin due to Factor V deficiency from Dr. Nicolasa Ramey M.D. on 11/24/2020     7th message left for patient to try and establish care.   Standing lab order was already placed for the patient. Pt was supposed to get INR drawn week of 12/7. LVM for pt to call clinic back to establish care and to please get INR drawn.     INS: [facility]  Physician: Dr. Nicolasa Ramey ([facility])  Locations: ANY      [facility] Anticoagulation Clinic [phone]     Kelton Mahan, DarrenD

## 2020-12-22 ENCOUNTER — TELEPHONE (OUTPATIENT)
Dept: VASCULAR LAB | Facility: MEDICAL CENTER | Age: 45
End: 2020-12-22

## 2020-12-22 NOTE — TELEPHONE ENCOUNTER
[facility] [place] [facility]     Received anticoagulation referral for warfarin due to Factor V deficiency from Dr. Nicolasa Ramey M.D. on 11/24/2020     S/w pt - he has been getting our messages, but he lives in [place].  Informed him there is a standing lab order in place - he can go to the lab in [place].  The soonest he can go will be the week of 12/28.    INS: [facility]  Physician: Dr. Nicolasa Ramey ([facility])  Locations: [facility]tico[facility] [phone]     Fabiola Rogers, DarrenD

## 2020-12-31 ENCOUNTER — TELEPHONE (OUTPATIENT)
Dept: VASCULAR LAB | Facility: MEDICAL CENTER | Age: 45
End: 2020-12-31

## 2020-12-31 NOTE — TELEPHONE ENCOUNTER
Spoke with pt on the phone regarding INR. Pt reports he will go to lab today to get INR. Advised pt we would not get result until 1/4/21 d/t the holiday. Pt is ok with this.     Helen Remy, DarrenD

## 2021-01-06 ENCOUNTER — TELEPHONE (OUTPATIENT)
Dept: VASCULAR LAB | Facility: MEDICAL CENTER | Age: 46
End: 2021-01-06

## 2021-01-14 ENCOUNTER — TELEPHONE (OUTPATIENT)
Dept: VASCULAR LAB | Facility: MEDICAL CENTER | Age: 46
End: 2021-01-14

## 2021-02-24 ENCOUNTER — APPOINTMENT (OUTPATIENT)
Dept: MEDICAL GROUP | Facility: PHYSICIAN GROUP | Age: 46
End: 2021-02-24
Payer: COMMERCIAL

## 2021-08-19 DIAGNOSIS — I10 ESSENTIAL HYPERTENSION: ICD-10-CM

## 2021-08-19 RX ORDER — LISINOPRIL 30 MG/1
30 TABLET ORAL DAILY
Qty: 90 TABLET | Refills: 0 | Status: SHIPPED | OUTPATIENT
Start: 2021-08-19

## 2021-08-19 NOTE — TELEPHONE ENCOUNTER
Received request via: Patient    Was the patient seen in the last year in this department? No     Does the patient have an active prescription (recently filled or refills available) for medication(s) requested? No     Karli has moved to [place]  OR

## 2021-10-27 DIAGNOSIS — D68.2 FACTOR V DEFICIENCY (HCC): ICD-10-CM

## 2021-10-27 DIAGNOSIS — F41.1 GAD (GENERALIZED ANXIETY DISORDER): ICD-10-CM

## 2021-10-27 RX ORDER — WARFARIN SODIUM 5 MG/1
5 TABLET ORAL
Qty: 90 TABLET | Refills: 0 | Status: SHIPPED | OUTPATIENT
Start: 2021-10-27

## 2021-10-27 NOTE — TELEPHONE ENCOUNTER
Received request via: Patient    Was the patient seen in the last year in this department? No  LAST SEEN 11/24/2020    Does the patient have an active prescription (recently filled or refills available) for medication(s) requested? No

## 2022-06-20 DIAGNOSIS — D68.2 FACTOR V DEFICIENCY (HCC): ICD-10-CM

## 2022-06-20 RX ORDER — WARFARIN SODIUM 5 MG/1
TABLET ORAL
Qty: 90 TABLET | Refills: 0 | OUTPATIENT
Start: 2022-06-20

## 2023-03-08 NOTE — TELEPHONE ENCOUNTER
Was the patient seen in the last year in this department? Yes     Does patient have an active prescription for medications requested? No     Received Request Via: Pharmacy     Pt is between jobs right now and does not have insurance. He is wondering if you will fill his coumadin. He has tried to contact coumadin clinic and is not getting any calls back. I informed pt he may need to come in before we can refill...      Yes home